# Patient Record
Sex: FEMALE | Race: WHITE | NOT HISPANIC OR LATINO | Employment: FULL TIME | URBAN - METROPOLITAN AREA
[De-identification: names, ages, dates, MRNs, and addresses within clinical notes are randomized per-mention and may not be internally consistent; named-entity substitution may affect disease eponyms.]

---

## 2023-05-03 LAB
C REACTIVE PROTEIN (MG/L) IN SER/PLAS: 0.84 MG/DL
CALCIDIOL (25 OH VITAMIN D3) (NG/ML) IN SER/PLAS: 32 NG/ML
SEDIMENTATION RATE, ERYTHROCYTE: 18 MM/H (ref 0–20)
URATE (MG/DL) IN SER/PLAS: 5.4 MG/DL (ref 2.3–6.7)

## 2023-05-05 LAB — HLAB27 TYPING: NEGATIVE

## 2023-05-08 LAB
ALBUMIN ELP: 4.3 G/DL (ref 3.4–5)
ALPHA 1: 0.3 G/DL (ref 0.2–0.6)
ALPHA 2: 0.9 G/DL (ref 0.4–1.1)
BETA: 1 G/DL (ref 0.5–1.2)
CITRULLINE ANTIBODY, IGG: 3 UNITS (ref 0–19)
GAMMA GLOBULIN: 1.1 G/DL (ref 0.5–1.4)
PATH REVIEW - SERUM IMMUNOFIXATION: NORMAL
PATH REVIEW-SERUM PROTEIN ELECTROPHORESIS: NORMAL
PROTEIN ELECTROPHORESIS INTERPRETATION: NORMAL
PROTEIN TOTAL: 7.6 G/DL (ref 6.4–8.2)
SERUM IMMUNOFIXATION INTERPRETATION: NORMAL

## 2023-09-27 DIAGNOSIS — E78.00 ELEVATED LDL CHOLESTEROL LEVEL: Primary | ICD-10-CM

## 2023-09-27 DIAGNOSIS — E78.5 HYPERLIPIDEMIA, UNSPECIFIED HYPERLIPIDEMIA TYPE: ICD-10-CM

## 2023-10-17 ENCOUNTER — APPOINTMENT (OUTPATIENT)
Dept: RADIOLOGY | Facility: CLINIC | Age: 33
End: 2023-10-17
Payer: COMMERCIAL

## 2023-10-18 ENCOUNTER — LAB (OUTPATIENT)
Dept: LAB | Facility: LAB | Age: 33
End: 2023-10-18
Payer: COMMERCIAL

## 2023-10-18 DIAGNOSIS — E78.00 ELEVATED LDL CHOLESTEROL LEVEL: ICD-10-CM

## 2023-10-18 LAB
ALBUMIN SERPL BCP-MCNC: 4.1 G/DL (ref 3.4–5)
ALP SERPL-CCNC: 107 U/L (ref 33–110)
ALT SERPL W P-5'-P-CCNC: 13 U/L (ref 7–45)
ANION GAP SERPL CALC-SCNC: 11 MMOL/L (ref 10–20)
AST SERPL W P-5'-P-CCNC: 16 U/L (ref 9–39)
BILIRUB SERPL-MCNC: 0.5 MG/DL (ref 0–1.2)
BUN SERPL-MCNC: 8 MG/DL (ref 6–23)
CALCIUM SERPL-MCNC: 9.4 MG/DL (ref 8.6–10.3)
CHLORIDE SERPL-SCNC: 103 MMOL/L (ref 98–107)
CHOLEST SERPL-MCNC: 242 MG/DL (ref 0–199)
CHOLESTEROL/HDL RATIO: 4.7
CO2 SERPL-SCNC: 28 MMOL/L (ref 21–32)
CREAT SERPL-MCNC: 0.76 MG/DL (ref 0.5–1.05)
GFR SERPL CREATININE-BSD FRML MDRD: >90 ML/MIN/1.73M*2
GLUCOSE SERPL-MCNC: 86 MG/DL (ref 74–99)
HDLC SERPL-MCNC: 51.5 MG/DL
LDLC SERPL CALC-MCNC: 152 MG/DL
NON HDL CHOLESTEROL: 191 MG/DL (ref 0–149)
POTASSIUM SERPL-SCNC: 4.1 MMOL/L (ref 3.5–5.3)
PROT SERPL-MCNC: 6.8 G/DL (ref 6.4–8.2)
SODIUM SERPL-SCNC: 138 MMOL/L (ref 136–145)
TRIGL SERPL-MCNC: 191 MG/DL (ref 0–149)
VLDL: 38 MG/DL (ref 0–40)

## 2023-10-18 PROCEDURE — 36415 COLL VENOUS BLD VENIPUNCTURE: CPT

## 2023-10-18 PROCEDURE — 80061 LIPID PANEL: CPT

## 2023-10-18 PROCEDURE — 80053 COMPREHEN METABOLIC PANEL: CPT

## 2023-10-19 NOTE — RESULT ENCOUNTER NOTE
Cholesterol 242, 51, 152, 191, send cholesterol remains elevated, not currently taking medication again it may be something you want to consider due to the fact that you have that marginal risk, especially going back to when we did that small particle LDL which was noted to be 472.  If it is something that you would consider, please schedule an appointment so we can discuss options for you    Sugar, kidney, liver, electrolytes normal

## 2023-10-23 ENCOUNTER — OFFICE VISIT (OUTPATIENT)
Dept: PRIMARY CARE | Facility: CLINIC | Age: 33
End: 2023-10-23
Payer: COMMERCIAL

## 2023-10-23 VITALS
HEIGHT: 68 IN | WEIGHT: 169.6 LBS | DIASTOLIC BLOOD PRESSURE: 70 MMHG | SYSTOLIC BLOOD PRESSURE: 104 MMHG | HEART RATE: 81 BPM | BODY MASS INDEX: 25.7 KG/M2

## 2023-10-23 DIAGNOSIS — L40.50 PSORIATIC ARTHRITIS (MULTI): ICD-10-CM

## 2023-10-23 DIAGNOSIS — E78.00 ELEVATED LDL CHOLESTEROL LEVEL: Primary | ICD-10-CM

## 2023-10-23 PROBLEM — R76.8 ANA POSITIVE: Status: ACTIVE | Noted: 2023-10-23

## 2023-10-23 PROBLEM — N93.8 DYSFUNCTIONAL UTERINE BLEEDING: Status: ACTIVE | Noted: 2023-10-23

## 2023-10-23 PROBLEM — L90.0 LICHEN SCLEROSUS: Status: ACTIVE | Noted: 2023-10-23

## 2023-10-23 PROBLEM — K21.9 GERD WITHOUT ESOPHAGITIS: Status: ACTIVE | Noted: 2023-10-23

## 2023-10-23 PROBLEM — H52.203 ASTIGMATISM, BILATERAL: Status: ACTIVE | Noted: 2023-10-23

## 2023-10-23 PROCEDURE — 1036F TOBACCO NON-USER: CPT | Performed by: FAMILY MEDICINE

## 2023-10-23 PROCEDURE — 99213 OFFICE O/P EST LOW 20 MIN: CPT | Performed by: FAMILY MEDICINE

## 2023-10-23 RX ORDER — PANTOPRAZOLE SODIUM 40 MG/1
TABLET, DELAYED RELEASE ORAL
COMMUNITY
Start: 2023-01-11

## 2023-10-23 RX ORDER — ATORVASTATIN CALCIUM 10 MG/1
10 TABLET, FILM COATED ORAL DAILY
Qty: 90 TABLET | Refills: 1 | Status: SHIPPED | OUTPATIENT
Start: 2023-10-23 | End: 2024-04-10 | Stop reason: SDUPTHER

## 2023-10-23 RX ORDER — DOCUSATE SODIUM 100 MG/1
100 CAPSULE, LIQUID FILLED ORAL 2 TIMES DAILY PRN
COMMUNITY
Start: 2021-08-23

## 2023-10-23 NOTE — PROGRESS NOTES
Subjective   Patient ID: Babita Oconnor is a 33 y.o. female who presents for Hyperlipidemia.    HPI  Lipid panel from 10/18/2023 revealing cholesterol 242, 51, 152, 191  Here today to discuss starting medication especially since a small particle LDL was noted to be 472 in the past  Reports that her and her  plan on getting pregnant within the next year or so and wonders if starting medication is safe    Review of Systems  All pertinent positive symptoms are included in the history of present illness.    All other systems have been reviewed and are negative and noncontributory to this patient's current ailments.    Past Medical History:   Diagnosis Date    12 weeks gestation of pregnancy 02/17/2021    12 weeks gestation of pregnancy    16 weeks gestation of pregnancy 03/19/2021    16 weeks gestation of pregnancy    20 weeks gestation of pregnancy 04/16/2021    20 weeks gestation of pregnancy    22 weeks gestation of pregnancy 05/04/2021    22 weeks gestation of pregnancy    24 weeks gestation of pregnancy 05/17/2021    24 weeks gestation of pregnancy    28 weeks gestation of pregnancy 06/11/2021    28 weeks gestation of pregnancy    32 weeks gestation of pregnancy 07/09/2021    32 weeks gestation of pregnancy    34 weeks gestation of pregnancy 07/21/2021    34 weeks gestation of pregnancy    36 weeks gestation of pregnancy 08/06/2021    36 weeks gestation of pregnancy    37 weeks gestation of pregnancy 08/17/2021    37 weeks gestation of pregnancy    38 weeks gestation of pregnancy 08/20/2021    38 weeks gestation of pregnancy    Encounter for gynecological examination (general) (routine) without abnormal findings 03/18/2022    Well woman exam with routine gynecological exam    Encounter for immunization 07/09/2021    Encounter for immunization    Encounter for insertion of intrauterine contraceptive device 09/26/2022    Encounter for insertion of progestin-releasing intrauterine contraceptive device (IUD)     Encounter for routine postpartum follow-up 2021    Postpartum exam    False labor before 37 completed weeks of gestation, unspecified trimester     Threatened premature labor, antepartum    Missed  2020    Missed     Other specified health status     Known health problems: none    Pelvic and perineal pain 2021    Pelvic pain in female    Personal history of other complications of pregnancy, childbirth and the puerperium 2021    History of hyperemesis gravidarum    Personal history of other diseases of the female genital tract 10/06/2020    History of dysfunctional uterine bleeding    Personal history of other drug therapy 10/28/2022    History of influenza vaccination    Personal history of other specified conditions 2021    History of decreased fetal movements    Personal history of other specified conditions 2020    History of dysuria    Secondary amenorrhea 2021    Secondary amenorrhea    Threatened  2020    , threatened, antepartum     Past Surgical History:   Procedure Laterality Date    OTHER SURGICAL HISTORY  2022    Nasal septoplasty    OTHER SURGICAL HISTORY  2022    [place] tooth extraction     Social History     Tobacco Use    Smoking status: Never    Smokeless tobacco: Never     No family history on file.  Allergies   Allergen Reactions    Nitrofurantoin Monohyd/M-Cryst Itching and Other     Itching palms and feet    Penicillin Other    Sulfamethoxazole-Trimethoprim Rash     Immunization History   Administered Date(s) Administered    DTP 1988, 1988, 1988, 10/06/1989, 1993    Flu vaccine (IIV4), preservative free *Check age/dose* 10/28/2022    HPV, Quadrivalent 2007, 2007, 2007    Hepatitis B vaccine, pediatric/adolescent (RECOMBIVAX, ENGERIX) 2001, 2001, 2002    Hib (HbOC) 1989    Influenza, Unspecified 10/01/2011    Influenza, injectable, MDCK,  "preservative free, quadrivalent 01/09/2022, 10/19/2023    Influenza, injectable, quadrivalent 01/31/2020    MMR vaccine, subcutaneous (MMR II) 07/05/1989, 06/30/2000    Meningococcal MPSV4 01/11/2006    OPV 1988, 1988, 1988, 04/06/1990, 04/02/1993    Pfizer Purple Cap SARS-CoV-2 05/21/2021, 06/11/2021, 01/09/2022    Td vaccine, age 7 years and older (TDVAX) 06/30/2000    Tdap vaccine, age 7 year and older (BOOSTRIX) 03/04/2009, 07/09/2021    Varicella vaccine, subcutaneous (VARIVAX) 04/27/1995     Current Outpatient Medications   Medication Instructions    atorvastatin (LIPITOR) 10 mg, oral, Daily    docusate sodium (COLACE) 100 mg, oral, 2 times daily PRN    pantoprazole (ProtoNix) 40 mg EC tablet oral     Objective   Visit Vitals  /70 (BP Location: Left arm, Patient Position: Sitting, BP Cuff Size: Adult)   Pulse 81   Ht 1.715 m (5' 7.5\")   Wt 76.9 kg (169 lb 9.6 oz)   BMI 26.17 kg/m²   Smoking Status Never   BSA 1.91 m²     Lab on 10/18/2023   Component Date Value    Cholesterol 10/18/2023 242 (H)     HDL-Cholesterol 10/18/2023 51.5     Cholesterol/HDL Ratio 10/18/2023 4.7     LDL Calculated 10/18/2023 152 (H)     VLDL 10/18/2023 38     Triglycerides 10/18/2023 191 (H)     Non HDL Cholesterol 10/18/2023 191 (H)     Glucose 10/18/2023 86     Sodium 10/18/2023 138     Potassium 10/18/2023 4.1     Chloride 10/18/2023 103     Bicarbonate 10/18/2023 28     Anion Gap 10/18/2023 11     Urea Nitrogen 10/18/2023 8     Creatinine 10/18/2023 0.76     eGFR 10/18/2023 >90     Calcium 10/18/2023 9.4     Albumin 10/18/2023 4.1     Alkaline Phosphatase 10/18/2023 107     Total Protein 10/18/2023 6.8     AST 10/18/2023 16     Bilirubin, Total 10/18/2023 0.5     ALT 10/18/2023 13      Physical Exam  CONSTITUTIONAL - well nourished, well developed, looks like stated age, in no acute distress, not ill-appearing, and not tired appearing  SKIN - normal skin color and pigmentation, normal skin turgor without " rash, lesions, or nodules visualized  HEAD - no trauma, normocephalic  CHEST - clear to auscultation, no wheezing, no crackles and no rales, good effort  CARDIAC - regular rate and regular rhythm, no skipped beats, no murmur  EXTREMITIES - no edema, no deformities  NEUROLOGICAL - normal gait, normal balance, normal motor  PSYCHIATRIC - alert, pleasant and cordial, age-appropriate     Assessment/Plan   Problem List Items Addressed This Visit       Elevated LDL cholesterol level - Primary     Through shared decision making, we decided to start you on atorvastatin 10 mg daily  Please follow-up in 6 months  This medication is NOT safe for pregnancy, as mentioned, please notify Dr. Santos prior to trying so that we can safely discontinue this medication         Relevant Medications    atorvastatin (Lipitor) 10 mg tablet    Psoriatic arthritis (CMS/HCC)     Please continue to follow with rheumatology per protocol

## 2023-10-23 NOTE — ASSESSMENT & PLAN NOTE
Through shared decision making, we decided to start you on atorvastatin 10 mg daily  Please follow-up in 6 months  This medication is NOT safe for pregnancy, as mentioned, please notify Dr. Santos prior to trying so that we can safely discontinue this medication

## 2023-12-14 RX ORDER — CLOBETASOL PROPIONATE 0.5 MG/G
CREAM TOPICAL 2 TIMES DAILY
COMMUNITY

## 2023-12-14 RX ORDER — PROGESTERONE 100 MG/1
100 CAPSULE ORAL DAILY
COMMUNITY

## 2023-12-14 RX ORDER — FLUOCINOLONE ACETONIDE 0.1 MG/G
CREAM TOPICAL 2 TIMES DAILY
COMMUNITY

## 2023-12-14 RX ORDER — KETOCONAZOLE 20 MG/G
CREAM TOPICAL DAILY
COMMUNITY

## 2024-01-03 ENCOUNTER — APPOINTMENT (OUTPATIENT)
Dept: RHEUMATOLOGY | Facility: CLINIC | Age: 34
End: 2024-01-03
Payer: COMMERCIAL

## 2024-01-23 ENCOUNTER — APPOINTMENT (OUTPATIENT)
Dept: RHEUMATOLOGY | Facility: CLINIC | Age: 34
End: 2024-01-23
Payer: COMMERCIAL

## 2024-02-01 ENCOUNTER — OFFICE VISIT (OUTPATIENT)
Dept: PRIMARY CARE | Facility: CLINIC | Age: 34
End: 2024-02-01
Payer: COMMERCIAL

## 2024-02-01 VITALS
WEIGHT: 168 LBS | DIASTOLIC BLOOD PRESSURE: 70 MMHG | BODY MASS INDEX: 25.92 KG/M2 | HEART RATE: 68 BPM | SYSTOLIC BLOOD PRESSURE: 120 MMHG

## 2024-02-01 DIAGNOSIS — L40.50 PSORIATIC ARTHRITIS (MULTI): ICD-10-CM

## 2024-02-01 DIAGNOSIS — F41.9 ANXIETY: Primary | ICD-10-CM

## 2024-02-01 PROBLEM — R30.0 DYSURIA: Status: ACTIVE | Noted: 2024-02-01

## 2024-02-01 PROBLEM — O21.0 HYPEREMESIS GRAVIDARUM (HHS-HCC): Status: ACTIVE | Noted: 2024-02-01

## 2024-02-01 PROBLEM — O16.9: Status: ACTIVE | Noted: 2024-02-01

## 2024-02-01 PROBLEM — O36.8190 DECREASED FETAL MOVEMENT DURING PREGNANCY (HHS-HCC): Status: ACTIVE | Noted: 2024-02-01

## 2024-02-01 PROBLEM — M25.50 ARTHRALGIA OF MULTIPLE JOINTS: Status: ACTIVE | Noted: 2024-02-01

## 2024-02-01 PROCEDURE — 3074F SYST BP LT 130 MM HG: CPT | Performed by: FAMILY MEDICINE

## 2024-02-01 PROCEDURE — 99214 OFFICE O/P EST MOD 30 MIN: CPT | Performed by: FAMILY MEDICINE

## 2024-02-01 PROCEDURE — 3078F DIAST BP <80 MM HG: CPT | Performed by: FAMILY MEDICINE

## 2024-02-01 PROCEDURE — 1036F TOBACCO NON-USER: CPT | Performed by: FAMILY MEDICINE

## 2024-02-01 RX ORDER — FLUOXETINE 10 MG/1
10 CAPSULE ORAL DAILY
Qty: 30 CAPSULE | Refills: 0 | Status: SHIPPED | OUTPATIENT
Start: 2024-02-01 | End: 2024-02-19 | Stop reason: ALTCHOICE

## 2024-02-01 ASSESSMENT — PATIENT HEALTH QUESTIONNAIRE - PHQ9
2. FEELING DOWN, DEPRESSED OR HOPELESS: NOT AT ALL
SUM OF ALL RESPONSES TO PHQ9 QUESTIONS 1 AND 2: 0
1. LITTLE INTEREST OR PLEASURE IN DOING THINGS: NOT AT ALL

## 2024-02-01 NOTE — PROGRESS NOTES
Subjective   Patient ID: Babita Oconnor is a 33 y.o. female who presents for Anxiety.    Past Medical, Surgical, and Family History reviewed and updated in chart.    Reviewed all medications by prescribing practitioner or clinical pharmacist (such as prescriptions, OTCs, herbal therapies and supplements) and documented in the medical record.    HPI  Babita has returned to discuss an increase in her anxiety symptoms over the past month, coinciding with the start of her new job in [employer]. While she is enjoying her new role, the steep learning curve has been a source of stress. Her anxiety was further heightened approximately three weeks ago during halfway financial planning discussions with her .    Symptoms she has been experiencing include daily chest tightness, diarrhea, decreased appetite, and difficulty concentrating. Babita also mentions that the current dreary weather may be contributing to her symptoms, although she does not believe she is depressed and denies any suicidal thoughts.    She has a history of postpartum depression following the birth of her daughter a year and a half ago. During that time, she used Zoloft, which initially helped but eventually lost its effectiveness. She also tried Wellbutrin, Xanax, and Lexapro. However, Babita found that Lexapro amplified her anxiety and depression, leading her to discontinue all anxiety and depression medications. She was doing well until recently.    Babita has tried walking and meditation to alleviate her anxiety, but these methods have only provided mild relief. She has also tried Xanax, prescribed by her former primary care physician three years ago. However, she does not like the drowsiness it causes.    She is not interested in trying Pristiq, having observed her 's negative experience with this medication. She is open to trying a daily medication, which she can gradually taper off. Babita denies experiencing  shortness of breath, panic attacks, palpitations, or any other concerns. She has noticed occasional tachycardia when checking her heart rate on her Apple watch, but she has not recorded any abnormal EKG readings.    Despite her recent increase in anxiety, Babita feels supported at home and is merely seeking assistance in managing her anxiety. She continues to see her therapist regularly.    Review of Systems  All pertinent positive symptoms are included in the history of present illness.    All other systems have been reviewed and are negative and noncontributory to this patient's current ailments.    Past Medical History:   Diagnosis Date    12 weeks gestation of pregnancy 02/17/2021    12 weeks gestation of pregnancy    16 weeks gestation of pregnancy 03/19/2021    16 weeks gestation of pregnancy    20 weeks gestation of pregnancy 04/16/2021    20 weeks gestation of pregnancy    22 weeks gestation of pregnancy 05/04/2021    22 weeks gestation of pregnancy    24 weeks gestation of pregnancy 05/17/2021    24 weeks gestation of pregnancy    28 weeks gestation of pregnancy 06/11/2021    28 weeks gestation of pregnancy    32 weeks gestation of pregnancy 07/09/2021    32 weeks gestation of pregnancy    34 weeks gestation of pregnancy 07/21/2021    34 weeks gestation of pregnancy    36 weeks gestation of pregnancy 08/06/2021    36 weeks gestation of pregnancy    37 weeks gestation of pregnancy 08/17/2021    37 weeks gestation of pregnancy    38 weeks gestation of pregnancy 08/20/2021    38 weeks gestation of pregnancy    Encounter for gynecological examination (general) (routine) without abnormal findings 03/18/2022    Well woman exam with routine gynecological exam    Encounter for immunization 07/09/2021    Encounter for immunization    Encounter for insertion of intrauterine contraceptive device 09/26/2022    Encounter for insertion of progestin-releasing intrauterine contraceptive device (IUD)    Encounter for  routine postpartum follow-up 2021    Postpartum exam    False labor before 37 completed weeks of gestation, unspecified trimester     Threatened premature labor, antepartum    Missed  2020    Missed     Other specified health status     Known health problems: none    Pelvic and perineal pain 2021    Pelvic pain in female    Personal history of other complications of pregnancy, childbirth and the puerperium 2021    History of hyperemesis gravidarum    Personal history of other diseases of the female genital tract 10/06/2020    History of dysfunctional uterine bleeding    Personal history of other drug therapy 10/28/2022    History of influenza vaccination    Personal history of other specified conditions 2021    History of decreased fetal movements    Personal history of other specified conditions 2020    History of dysuria    Secondary amenorrhea 2021    Secondary amenorrhea    Threatened  2020    , threatened, antepartum     Past Surgical History:   Procedure Laterality Date    OTHER SURGICAL HISTORY  2022    Nasal septoplasty    OTHER SURGICAL HISTORY  2022    [place] tooth extraction     Social History     Tobacco Use    Smoking status: Never    Smokeless tobacco: Never     Family History   Problem Relation Name Age of Onset    Atrial fibrillation Mother      Atrial fibrillation Father      Stroke Maternal Grandmother      Macular degeneration Maternal Grandmother       Immunization History   Administered Date(s) Administered    DTP 1988, 1988, 1988, 10/06/1989, 1993    Flu vaccine (IIV4), preservative free *Check age/dose* 10/28/2022    Flu vaccine, quadrivalent, no egg protein, age 6 month or greater (FLUCELVAX) 2022, 10/19/2023    HPV, Quadrivalent 2007, 2007, 2007    Hepatitis B vaccine, pediatric/adolescent (RECOMBIVAX, ENGERIX) 2001, 2001, 2002    Hib  (HbOC) 11/03/1989    Influenza, Unspecified 10/01/2011    Influenza, injectable, quadrivalent 01/31/2020    MMR vaccine, subcutaneous (MMR II) 07/05/1989, 06/30/2000    Meningococcal MPSV4 01/11/2006    OPV 1988, 1988, 1988, 04/06/1990, 04/02/1993    Pfizer Purple Cap SARS-CoV-2 05/21/2021, 06/11/2021, 01/09/2022    Td vaccine, age 7 years and older (TDVAX) 06/30/2000    Tdap vaccine, age 7 year and older (BOOSTRIX, ADACEL) 03/04/2009, 07/09/2021    Varicella vaccine, subcutaneous (VARIVAX) 04/27/1995     Current Outpatient Medications   Medication Instructions    atorvastatin (LIPITOR) 10 mg, oral, Daily    clobetasol (Temovate) 0.05 % cream Topical, 2 times daily    docusate sodium (COLACE) 100 mg, oral, 2 times daily PRN    fluocinolone 0.01 % cream Topical, 2 times daily    FLUoxetine (PROZAC) 10 mg, oral, Daily    ketoconazole (NIZOral) 2 % cream Topical, Daily    levonorgestreL (LILETTA) 20.4 mcg/24 hrs (8 yrs) 52 mg intrauterine device intrauterine, Once    pantoprazole (ProtoNix) 40 mg EC tablet oral    progesterone (PROMETRIUM) 100 mg, oral, Daily     Allergies   Allergen Reactions    Nitrofurantoin Monohyd/M-Cryst Itching and Other     Itching palms and feet    Penicillin Other    Sulfamethoxazole-Trimethoprim Rash       Objective   Vitals:    02/01/24 0750   BP: 120/70   Pulse: 68   Weight: 76.2 kg (168 lb)     Body mass index is 25.92 kg/m².    BP Readings from Last 3 Encounters:   02/01/24 120/70   10/23/23 104/70   06/20/23 118/77      Wt Readings from Last 3 Encounters:   02/01/24 76.2 kg (168 lb)   10/23/23 76.9 kg (169 lb 9.6 oz)   06/20/23 77.6 kg (171 lb)        No visits with results within 1 Month(s) from this visit.   Latest known visit with results is:   Lab on 10/18/2023   Component Date Value    Cholesterol 10/18/2023 242 (H)     HDL-Cholesterol 10/18/2023 51.5     Cholesterol/HDL Ratio 10/18/2023 4.7     LDL Calculated 10/18/2023 152 (H)     VLDL 10/18/2023 38      Triglycerides 10/18/2023 191 (H)     Non HDL Cholesterol 10/18/2023 191 (H)     Glucose 10/18/2023 86     Sodium 10/18/2023 138     Potassium 10/18/2023 4.1     Chloride 10/18/2023 103     Bicarbonate 10/18/2023 28     Anion Gap 10/18/2023 11     Urea Nitrogen 10/18/2023 8     Creatinine 10/18/2023 0.76     eGFR 10/18/2023 >90     Calcium 10/18/2023 9.4     Albumin 10/18/2023 4.1     Alkaline Phosphatase 10/18/2023 107     Total Protein 10/18/2023 6.8     AST 10/18/2023 16     Bilirubin, Total 10/18/2023 0.5     ALT 10/18/2023 13      Physical Exam  CONSTITUTIONAL - well nourished, well developed, looks like stated age, in no acute distress, not ill-appearing, and not tired appearing  SKIN - normal skin color and pigmentation, normal skin turgor without rash, lesions, or nodules visualized  HEAD - no trauma, normocephalic  EYES - pupils are equal and reactive to light, extraocular muscles are intact, and normal external exam  NECK - supple without rigidity, no neck mass was observed  CHEST - clear to auscultation, no wheezing, no crackles and no rales, good effort  CARDIAC - tachycardic rate and regular rhythm, no skipped beats, no murmur  ABDOMEN - no organomegaly, soft, nontender, nondistended, normal bowel sounds, no guarding/rebound/rigidity  EXTREMITIES - no obvious or evident edema, no obvious or evident deformities  NEUROLOGICAL - normal gait, normal balance, normal motor, no ataxia; alert, oriented and no focal signs  PSYCHIATRIC - alert, pleasant and cordial, age-appropriate    Assessment/Plan   Problem List Items Addressed This Visit       Psoriatic arthritis (CMS/HCC)     Stable on current medication regimen         Anxiety - Primary     Based on our discussion, it appears that your anxiety might be accompanied by some underlying depressive symptoms. We explored a range of medication options, including Pristiq, which you expressed concerns about due to your 's significant withdrawal symptoms. We  also considered BuSpar, but you preferred not to take medication twice daily. Ultimately, we decided on fluoxetine. Please try this medication for the next 3 to 4 weeks and inform me about its effectiveness and tolerability. After this trial period, I can adjust the medication as necessary.         Relevant Medications    FLUoxetine (PROzac) 10 mg capsule

## 2024-02-01 NOTE — ASSESSMENT & PLAN NOTE
Based on our discussion, it appears that your anxiety might be accompanied by some underlying depressive symptoms. We explored a range of medication options, including Pristiq, which you expressed concerns about due to your 's significant withdrawal symptoms. We also considered BuSpar, but you preferred not to take medication twice daily. Ultimately, we decided on fluoxetine. Please try this medication for the next 3 to 4 weeks and inform me about its effectiveness and tolerability. After this trial period, I can adjust the medication as necessary.

## 2024-02-19 DIAGNOSIS — F41.9 ANXIETY: Primary | ICD-10-CM

## 2024-02-19 RX ORDER — FLUOXETINE 20 MG/1
20 TABLET ORAL DAILY
Qty: 90 TABLET | Refills: 1 | Status: SHIPPED | OUTPATIENT
Start: 2024-02-19 | End: 2024-04-21

## 2024-02-27 ENCOUNTER — LAB (OUTPATIENT)
Dept: LAB | Facility: LAB | Age: 34
End: 2024-02-27
Payer: COMMERCIAL

## 2024-02-27 ENCOUNTER — OFFICE VISIT (OUTPATIENT)
Dept: RHEUMATOLOGY | Facility: CLINIC | Age: 34
End: 2024-02-27
Payer: COMMERCIAL

## 2024-02-27 VITALS
DIASTOLIC BLOOD PRESSURE: 79 MMHG | HEART RATE: 84 BPM | HEIGHT: 68 IN | WEIGHT: 167.3 LBS | SYSTOLIC BLOOD PRESSURE: 117 MMHG | BODY MASS INDEX: 25.36 KG/M2

## 2024-02-27 DIAGNOSIS — L40.50 PSORIATIC ARTHRITIS (MULTI): ICD-10-CM

## 2024-02-27 DIAGNOSIS — L40.50 PSORIATIC ARTHRITIS (MULTI): Primary | ICD-10-CM

## 2024-02-27 LAB
25(OH)D3 SERPL-MCNC: 22 NG/ML (ref 30–100)
CRP SERPL-MCNC: 0.36 MG/DL
ERYTHROCYTE [DISTWIDTH] IN BLOOD BY AUTOMATED COUNT: 12.8 % (ref 11.5–14.5)
ERYTHROCYTE [SEDIMENTATION RATE] IN BLOOD BY WESTERGREN METHOD: 17 MM/H (ref 0–20)
HCT VFR BLD AUTO: 41 % (ref 36–46)
HGB BLD-MCNC: 13.4 G/DL (ref 12–16)
MCH RBC QN AUTO: 30 PG (ref 26–34)
MCHC RBC AUTO-ENTMCNC: 32.7 G/DL (ref 32–36)
MCV RBC AUTO: 92 FL (ref 80–100)
NRBC BLD-RTO: 0 /100 WBCS (ref 0–0)
PLATELET # BLD AUTO: 330 X10*3/UL (ref 150–450)
RBC # BLD AUTO: 4.47 X10*6/UL (ref 4–5.2)
URATE SERPL-MCNC: 4.4 MG/DL (ref 2.3–6.7)
WBC # BLD AUTO: 4.9 X10*3/UL (ref 4.4–11.3)

## 2024-02-27 PROCEDURE — 36415 COLL VENOUS BLD VENIPUNCTURE: CPT

## 2024-02-27 PROCEDURE — 3074F SYST BP LT 130 MM HG: CPT | Performed by: INTERNAL MEDICINE

## 2024-02-27 PROCEDURE — 82306 VITAMIN D 25 HYDROXY: CPT

## 2024-02-27 PROCEDURE — 86140 C-REACTIVE PROTEIN: CPT

## 2024-02-27 PROCEDURE — 3078F DIAST BP <80 MM HG: CPT | Performed by: INTERNAL MEDICINE

## 2024-02-27 PROCEDURE — 84550 ASSAY OF BLOOD/URIC ACID: CPT

## 2024-02-27 PROCEDURE — 99214 OFFICE O/P EST MOD 30 MIN: CPT | Performed by: INTERNAL MEDICINE

## 2024-02-27 PROCEDURE — 85652 RBC SED RATE AUTOMATED: CPT

## 2024-02-27 PROCEDURE — 1036F TOBACCO NON-USER: CPT | Performed by: INTERNAL MEDICINE

## 2024-02-27 PROCEDURE — 85027 COMPLETE CBC AUTOMATED: CPT

## 2024-02-27 NOTE — PROGRESS NOTES
"Follow-up Rheumatology Patient Visit    Chief Complaint:  Babita Oconnor \"David" is a 33 y.o. female presenting today for Follow-up.    History of Presenting Problem:   34 y/o female with Hx of psoriasis and Diagnosis of PSA after pregnancy at Age 31 present for evaluation. Patient reports her symptoms are mild is to be worse in her ankles, nowadays she has minimal joint complaints, s   She does have a history of severe GERD. She has tried anti-inflammatories in the past which does help pain.  he sees dermatology for psoriatic on her scalp.  At this time she prefers to stay off medications as she reports her joint pain in the ankle is minimal and she is taking topical treatment for psoriasis of the scalp at this time. Dermatologist did recommend another biologic but she does not want to proceed with this. She is managing her diet which seems to be working for her as well  Previous meds:    Tremfya-No benefit  Plaquenil-could not tolerate  She has been on Otezla - improvement but had HAs  Humira- no improvement       Problem List:   Patient Active Problem List   Diagnosis    Elevated LDL cholesterol level    BERNARD positive    Astigmatism, bilateral    Dysfunctional uterine bleeding    GERD without esophagitis    Lichen sclerosus    Psoriasis    Psoriatic arthritis (CMS/HCC)    Arthralgia of multiple joints    Decreased fetal movement during pregnancy    Dysuria    Hyperemesis gravidarum    Hypertension in obstetric context    Mild postpartum depression    Vaginal delivery    Anxiety       Past Medical History:   Past Medical History:   Diagnosis Date    12 weeks gestation of pregnancy 02/17/2021    12 weeks gestation of pregnancy    16 weeks gestation of pregnancy 03/19/2021    16 weeks gestation of pregnancy    20 weeks gestation of pregnancy 04/16/2021    20 weeks gestation of pregnancy    22 weeks gestation of pregnancy 05/04/2021    22 weeks gestation of pregnancy    24 weeks gestation of pregnancy 05/17/2021 "    24 weeks gestation of pregnancy    28 weeks gestation of pregnancy 2021    28 weeks gestation of pregnancy    32 weeks gestation of pregnancy 2021    32 weeks gestation of pregnancy    34 weeks gestation of pregnancy 2021    34 weeks gestation of pregnancy    36 weeks gestation of pregnancy 2021    36 weeks gestation of pregnancy    37 weeks gestation of pregnancy 2021    37 weeks gestation of pregnancy    38 weeks gestation of pregnancy 2021    38 weeks gestation of pregnancy    Encounter for gynecological examination (general) (routine) without abnormal findings 2022    Well woman exam with routine gynecological exam    Encounter for immunization 2021    Encounter for immunization    Encounter for insertion of intrauterine contraceptive device 2022    Encounter for insertion of progestin-releasing intrauterine contraceptive device (IUD)    Encounter for routine postpartum follow-up 2021    Postpartum exam    False labor before 37 completed weeks of gestation, unspecified trimester     Threatened premature labor, antepartum    Missed  2020    Missed     Other specified health status     Known health problems: none    Pelvic and perineal pain 2021    Pelvic pain in female    Personal history of other complications of pregnancy, childbirth and the puerperium 2021    History of hyperemesis gravidarum    Personal history of other diseases of the female genital tract 10/06/2020    History of dysfunctional uterine bleeding    Personal history of other drug therapy 10/28/2022    History of influenza vaccination    Personal history of other specified conditions 2021    History of decreased fetal movements    Personal history of other specified conditions 2020    History of dysuria    Secondary amenorrhea 2021    Secondary amenorrhea    Threatened  2020    , threatened, antepartum  "      Surgical History:   Past Surgical History:   Procedure Laterality Date    OTHER SURGICAL HISTORY  03/31/2022    Nasal septoplasty    OTHER SURGICAL HISTORY  03/31/2022    [place] tooth extraction        Allergies:   Allergies   Allergen Reactions    Nitrofurantoin Monohyd/M-Cryst Itching and Other     Itching palms and feet    Penicillin Other    Sulfamethoxazole-Trimethoprim Rash       Medications:   Current Outpatient Medications:     atorvastatin (Lipitor) 10 mg tablet, Take 1 tablet (10 mg) by mouth once daily., Disp: 90 tablet, Rfl: 1    clobetasol (Temovate) 0.05 % cream, Apply topically 2 times a day., Disp: , Rfl:     docusate sodium (Colace) 100 mg capsule, Take 1 capsule (100 mg) by mouth 2 times a day as needed for constipation., Disp: , Rfl:     fluocinolone 0.01 % cream, Apply topically 2 times a day., Disp: , Rfl:     FLUoxetine (PROzac) 20 mg tablet, Take 1 tablet (20 mg) by mouth once daily., Disp: 90 tablet, Rfl: 1    ketoconazole (NIZOral) 2 % cream, Apply topically once daily., Disp: , Rfl:     levonorgestreL (LILETTA) 20.4 mcg/24 hrs (8 yrs) 52 mg intrauterine device, by intrauterine route 1 time., Disp: , Rfl:     pantoprazole (ProtoNix) 40 mg EC tablet, Take by mouth., Disp: , Rfl:     progesterone (Prometrium) 100 mg capsule, Take 1 capsule (100 mg) by mouth once daily., Disp: , Rfl:       Objective   Physical Examination:    Visit Vitals  /79   Pulse 84   Ht 1.715 m (5' 7.52\")   Wt 75.9 kg (167 lb 4.8 oz)   BMI 25.80 kg/m²   Smoking Status Never   BSA 1.9 m²        Gen: NAD, A&O x 3  HEENT: clear sclera and conjunctiva,     Musculoskeletal:   Neck; WNL, full ROM  Shoulder: WNL, full ROM  Elbow:WNL, full ROM, no effusion noted  Wrist and fingers;no active synovitis noted, Full ROM in the Wrist , Good fist and   Knees:  No effusions or crepitation, full ROM.  Hips; WNL, full ROM, Negative Dominic test  Ankle, Feet; WNL, full ROM    Skin: No rashes or lesions seen, no nail " changes  Neuro: A&O x3, Normal Gait    Procedures :None    Orders:  Orders Placed This Encounter   Procedures    CBC    Vitamin D 25-Hydroxy,Total (for eval of Vitamin D levels)    Sedimentation Rate    C-Reactive Protein    Uric Acid        Provider Impression:   Assessment/Plan   Encounter Diagnosis   Name Primary?    Psoriatic arthritis (CMS/HCC) Yes          32 y/o female with Hx of psoriasis and Diagnosis of PSA after pregnancy at Age 31 present for evaluation.  Patient has been followed without medication regimen for the last 6 months.  She reports no acute joint issues at this time patient is reporting her joint pain is minimal and not limiting her daily activities that she would like to proceed without a medication regimen at this time  -Okay to hold  off on immunosuppressant/DMARDS at this time as patient does not feel any beneficial results from taking the medication.  -check additional serologies  -Follow as needed patient has been following without

## 2024-02-28 DIAGNOSIS — E55.9 VITAMIN D DEFICIENCY: ICD-10-CM

## 2024-02-28 RX ORDER — ERGOCALCIFEROL 1.25 MG/1
50000 CAPSULE ORAL
Qty: 12 CAPSULE | Refills: 0 | Status: SHIPPED | OUTPATIENT
Start: 2024-02-28

## 2024-04-02 DIAGNOSIS — Z00.00 ANNUAL PHYSICAL EXAM: Primary | ICD-10-CM

## 2024-04-02 DIAGNOSIS — E78.00 ELEVATED LDL CHOLESTEROL LEVEL: ICD-10-CM

## 2024-04-10 ENCOUNTER — LAB (OUTPATIENT)
Dept: LAB | Facility: LAB | Age: 34
End: 2024-04-10
Payer: COMMERCIAL

## 2024-04-10 DIAGNOSIS — Z00.00 ANNUAL PHYSICAL EXAM: ICD-10-CM

## 2024-04-10 DIAGNOSIS — E78.00 ELEVATED LDL CHOLESTEROL LEVEL: ICD-10-CM

## 2024-04-10 LAB
ALBUMIN SERPL BCP-MCNC: 4.2 G/DL (ref 3.4–5)
ALP SERPL-CCNC: 92 U/L (ref 33–110)
ALT SERPL W P-5'-P-CCNC: 15 U/L (ref 7–45)
ANION GAP SERPL CALC-SCNC: 11 MMOL/L (ref 10–20)
AST SERPL W P-5'-P-CCNC: 16 U/L (ref 9–39)
BASOPHILS # BLD AUTO: 0.07 X10*3/UL (ref 0–0.1)
BASOPHILS NFR BLD AUTO: 1.2 %
BILIRUB SERPL-MCNC: 0.5 MG/DL (ref 0–1.2)
BUN SERPL-MCNC: 6 MG/DL (ref 6–23)
CALCIUM SERPL-MCNC: 9.2 MG/DL (ref 8.6–10.3)
CHLORIDE SERPL-SCNC: 104 MMOL/L (ref 98–107)
CHOLEST SERPL-MCNC: 180 MG/DL (ref 0–199)
CHOLESTEROL/HDL RATIO: 2.7
CO2 SERPL-SCNC: 27 MMOL/L (ref 21–32)
CREAT SERPL-MCNC: 0.72 MG/DL (ref 0.5–1.05)
EGFRCR SERPLBLD CKD-EPI 2021: >90 ML/MIN/1.73M*2
EOSINOPHIL # BLD AUTO: 0.12 X10*3/UL (ref 0–0.7)
EOSINOPHIL NFR BLD AUTO: 2.1 %
ERYTHROCYTE [DISTWIDTH] IN BLOOD BY AUTOMATED COUNT: 12.5 % (ref 11.5–14.5)
GLUCOSE SERPL-MCNC: 85 MG/DL (ref 74–99)
HCT VFR BLD AUTO: 41.7 % (ref 36–46)
HDLC SERPL-MCNC: 66.4 MG/DL
HGB BLD-MCNC: 13.7 G/DL (ref 12–16)
IMM GRANULOCYTES # BLD AUTO: 0.01 X10*3/UL (ref 0–0.7)
IMM GRANULOCYTES NFR BLD AUTO: 0.2 % (ref 0–0.9)
LDLC SERPL CALC-MCNC: 86 MG/DL
LYMPHOCYTES # BLD AUTO: 1.78 X10*3/UL (ref 1.2–4.8)
LYMPHOCYTES NFR BLD AUTO: 30.9 %
MCH RBC QN AUTO: 29.8 PG (ref 26–34)
MCHC RBC AUTO-ENTMCNC: 32.9 G/DL (ref 32–36)
MCV RBC AUTO: 91 FL (ref 80–100)
MONOCYTES # BLD AUTO: 0.45 X10*3/UL (ref 0.1–1)
MONOCYTES NFR BLD AUTO: 7.8 %
NEUTROPHILS # BLD AUTO: 3.33 X10*3/UL (ref 1.2–7.7)
NEUTROPHILS NFR BLD AUTO: 57.8 %
NON HDL CHOLESTEROL: 114 MG/DL (ref 0–149)
NRBC BLD-RTO: 0 /100 WBCS (ref 0–0)
PLATELET # BLD AUTO: 309 X10*3/UL (ref 150–450)
POTASSIUM SERPL-SCNC: 4.2 MMOL/L (ref 3.5–5.3)
PROT SERPL-MCNC: 6.8 G/DL (ref 6.4–8.2)
RBC # BLD AUTO: 4.6 X10*6/UL (ref 4–5.2)
SODIUM SERPL-SCNC: 138 MMOL/L (ref 136–145)
TRIGL SERPL-MCNC: 137 MG/DL (ref 0–149)
TSH SERPL-ACNC: 1.39 MIU/L (ref 0.44–3.98)
VLDL: 27 MG/DL (ref 0–40)
WBC # BLD AUTO: 5.8 X10*3/UL (ref 4.4–11.3)

## 2024-04-10 PROCEDURE — 85025 COMPLETE CBC W/AUTO DIFF WBC: CPT

## 2024-04-10 PROCEDURE — 36415 COLL VENOUS BLD VENIPUNCTURE: CPT

## 2024-04-10 PROCEDURE — 84443 ASSAY THYROID STIM HORMONE: CPT

## 2024-04-10 PROCEDURE — 80053 COMPREHEN METABOLIC PANEL: CPT

## 2024-04-10 PROCEDURE — 80061 LIPID PANEL: CPT

## 2024-04-10 RX ORDER — ATORVASTATIN CALCIUM 10 MG/1
10 TABLET, FILM COATED ORAL DAILY
Qty: 90 TABLET | Refills: 1 | Status: SHIPPED | OUTPATIENT
Start: 2024-04-10 | End: 2024-10-07

## 2024-04-10 NOTE — RESULT ENCOUNTER NOTE
Thyroid looks perfect with a TSH of 1.39  Cholesterol 180, 66, 86, 137.  1 incredible turnaround from your previous blood levels  Sugar, kidney, liver, electrolytes, complete blood cell count are excellent across-the-board    No changes to medication recommended, so I will send over Lipitor 10 mg daily for the next 6 months

## 2024-04-18 DIAGNOSIS — F41.9 ANXIETY: ICD-10-CM

## 2024-04-21 RX ORDER — FLUOXETINE 20 MG/1
20 TABLET ORAL DAILY
Qty: 90 TABLET | Refills: 0 | Status: SHIPPED | OUTPATIENT
Start: 2024-04-21 | End: 2024-07-20

## 2024-07-08 DIAGNOSIS — F41.9 ANXIETY: ICD-10-CM

## 2024-07-08 RX ORDER — FLUOXETINE 20 MG/1
20 TABLET ORAL DAILY
Qty: 90 TABLET | Refills: 0 | Status: SHIPPED | OUTPATIENT
Start: 2024-07-08 | End: 2024-10-06

## 2024-08-07 ENCOUNTER — APPOINTMENT (OUTPATIENT)
Dept: PRIMARY CARE | Facility: CLINIC | Age: 34
End: 2024-08-07
Payer: COMMERCIAL

## 2024-08-09 ENCOUNTER — OFFICE VISIT (OUTPATIENT)
Dept: PRIMARY CARE | Facility: CLINIC | Age: 34
End: 2024-08-09
Payer: COMMERCIAL

## 2024-08-09 VITALS
WEIGHT: 177 LBS | OXYGEN SATURATION: 98 % | SYSTOLIC BLOOD PRESSURE: 100 MMHG | HEART RATE: 76 BPM | DIASTOLIC BLOOD PRESSURE: 64 MMHG | BODY MASS INDEX: 27.3 KG/M2

## 2024-08-09 DIAGNOSIS — M79.632 PAIN IN BOTH FOREARMS: ICD-10-CM

## 2024-08-09 DIAGNOSIS — M79.631 PAIN IN BOTH FOREARMS: ICD-10-CM

## 2024-08-09 DIAGNOSIS — G56.23 ULNAR NEUROPATHY OF BOTH UPPER EXTREMITIES: Primary | ICD-10-CM

## 2024-08-09 PROCEDURE — 99214 OFFICE O/P EST MOD 30 MIN: CPT | Performed by: FAMILY MEDICINE

## 2024-08-09 PROCEDURE — 3078F DIAST BP <80 MM HG: CPT | Performed by: FAMILY MEDICINE

## 2024-08-09 PROCEDURE — 1036F TOBACCO NON-USER: CPT | Performed by: FAMILY MEDICINE

## 2024-08-09 PROCEDURE — 3074F SYST BP LT 130 MM HG: CPT | Performed by: FAMILY MEDICINE

## 2024-08-09 RX ORDER — METHYLPREDNISOLONE 4 MG/1
TABLET ORAL
Qty: 21 TABLET | Refills: 0 | Status: SHIPPED | OUTPATIENT
Start: 2024-08-09

## 2024-08-09 RX ORDER — FLUOCINONIDE TOPICAL SOLUTION USP, 0.05% 0.5 MG/ML
SOLUTION TOPICAL
COMMUNITY
Start: 2024-07-08

## 2024-08-09 ASSESSMENT — PATIENT HEALTH QUESTIONNAIRE - PHQ9
SUM OF ALL RESPONSES TO PHQ9 QUESTIONS 1 AND 2: 0
2. FEELING DOWN, DEPRESSED OR HOPELESS: NOT AT ALL
1. LITTLE INTEREST OR PLEASURE IN DOING THINGS: NOT AT ALL

## 2024-08-09 NOTE — PROGRESS NOTES
"Subjective   Patient ID: Babita Oconnor \"Emily\" is a 34 y.o. female who presents for Bilateral Finger Numbness.    Past Medical, Surgical, and Family History reviewed and updated in chart.    Reviewed all medications by prescribing practitioner or clinical pharmacist (such as prescriptions, OTCs, herbal therapies and supplements) and documented in the medical record.    KINGSLEY Diaz reports a few weeks' history of numbness and tingling in both forearms, starting at the elbows and radiating down to the wrists. 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    Emily notes that the symptoms worsen when she is driving or performing activities that require her to rest her forearms. She has not tried any over-the-counter relief methods. Emily denies any other neurologic symptoms such as blurry vision, worsening headaches, or numbness/tingling in the lower extremities.    It is noteworthy that Emily has a history of psoriatic arthritis, for which she follows up with a rheumatologist. However, she does not believe her current symptoms are consistent with an arthritis flare-up, as her arthritis predominantly affects her bilateral ankles.    Review of Systems  All pertinent positive symptoms are included in the history of present illness.    All other systems have been reviewed and are negative and noncontributory to this patient's current ailments.    Past Medical History:   Diagnosis Date    12 weeks gestation of pregnancy ([facility]) 02/17/2021    12 weeks gestation of pregnancy    16 weeks gestation of pregnancy ([facility]) 03/19/2021    16 weeks gestation of pregnancy    20 weeks gestation of pregnancy ([facility]) 04/16/2021    20 weeks gestation of pregnancy    22 weeks gestation of pregnancy ([facility]) 05/04/2021    22 weeks gestation of pregnancy    24 weeks gestation of pregnancy ([facility]) 05/17/2021    24 weeks gestation of " pregnancy    28 weeks gestation of pregnancy ([facility]) 2021    28 weeks gestation of pregnancy    32 weeks gestation of pregnancy ([facility]) 2021    32 weeks gestation of pregnancy    34 weeks gestation of pregnancy ([facility]) 2021    34 weeks gestation of pregnancy    36 weeks gestation of pregnancy ([facility]) 2021    36 weeks gestation of pregnancy    37 weeks gestation of pregnancy ([facility]) 2021    37 weeks gestation of pregnancy    38 weeks gestation of pregnancy ([facility]) 2021    38 weeks gestation of pregnancy    Encounter for gynecological examination (general) (routine) without abnormal findings 2022    Well woman exam with routine gynecological exam    Encounter for immunization 2021    Encounter for immunization    Encounter for insertion of intrauterine contraceptive device 2022    Encounter for insertion of progestin-releasing intrauterine contraceptive device (IUD)    Encounter for routine postpartum follow-up ([facility]) 2021    Postpartum exam    False labor before 37 completed weeks of gestation, unspecified trimester ([facility])     Threatened premature labor, antepartum    Missed  ([facility]) 2020    Missed     Other specified health status     Known health problems: none    Pelvic and perineal pain 2021    Pelvic pain in female    Personal history of other complications of pregnancy, childbirth and the puerperium 2021    History of hyperemesis gravidarum    Personal history of other diseases of the female genital tract 10/06/2020    History of dysfunctional uterine bleeding    Personal history of other drug therapy 10/28/2022    History of influenza vaccination    Personal history of other specified conditions 2021    History of decreased fetal movements    Personal history of other specified conditions 2020    History of dysuria    Secondary amenorrhea 2021    Secondary amenorrhea    Threatened   ([facility]) 2020    , threatened, antepartum     Past Surgical History:   Procedure Laterality Date    OTHER SURGICAL HISTORY  2022    Nasal septoplasty    OTHER SURGICAL HISTORY  2022    [place] tooth extraction     Social History     Tobacco Use    Smoking status: Never    Smokeless tobacco: Never     Family History   Problem Relation Name Age of Onset    Atrial fibrillation Mother      Atrial fibrillation Father      Stroke Maternal Grandmother      Macular degeneration Maternal Grandmother       Immunization History   Administered Date(s) Administered    DTP 1988, 1988, 1988, 10/06/1989, 1993    Flu vaccine (IIV4), preservative free *Check age/dose* 10/28/2022    Flu vaccine, quadrivalent, no egg protein, age 6 month or greater (FLUCELVAX) 2022, 10/19/2023    HPV, Quadrivalent 2007, 2007, 2007    Hepatitis B vaccine, 19 yrs and under (RECOMBIVAX, ENGERIX) 2001, 2001, 2002    Hib (HbOC) 1989    Influenza, Unspecified 10/01/2011    Influenza, injectable, quadrivalent 2020    MMR vaccine, subcutaneous (MMR II) 1989, 2000    Meningococcal MPSV4 2006    OPV 1988, 1988, 1988, 1990, 1993    Pfizer Purple Cap SARS-CoV-2 2021, 2021, 2022    Td vaccine, age 7 years and older (TDVAX) 2000    Tdap vaccine, age 7 year and older (BOOSTRIX, ADACEL) 2009, 2021    Varicella vaccine, subcutaneous (VARIVAX) 1995     Current Outpatient Medications   Medication Instructions    atorvastatin (LIPITOR) 10 mg, oral, Daily    clobetasol (Temovate) 0.05 % cream Topical, 2 times daily    docusate sodium (COLACE) 100 mg, oral, 2 times daily PRN    ergocalciferol (VITAMIN D-2) 50,000 Units, oral, Once Weekly    fluocinolone 0.01 % cream Topical, 2 times daily    fluocinonide (Lidex) 0.05 % external solution     FLUoxetine (PROZAC) 20 mg, oral,  Daily    ketoconazole (NIZOral) 2 % cream Topical, Daily    levonorgestreL (LILETTA) 20.4 mcg/24 hrs (8 yrs) 52 mg intrauterine device intrauterine, Once    methylPREDNISolone (Medrol, Saurabh,) 4 mg tablets Follow schedule on package instructions    pantoprazole (ProtoNix) 40 mg EC tablet oral    progesterone (PROMETRIUM) 100 mg, oral, Daily     Allergies   Allergen Reactions    Nitrofurantoin Monohyd/M-Cryst Itching and Other     Itching palms and feet    Penicillin Other    Sulfamethoxazole-Trimethoprim Rash       Objective   Vitals:    08/09/24 1409   BP: 100/64   BP Location: Left arm   Patient Position: Sitting   BP Cuff Size: Adult   Pulse: 76   SpO2: 98%   Weight: 80.3 kg (177 lb)     Body mass index is 27.3 kg/m².    BP Readings from Last 3 Encounters:   08/09/24 100/64   02/27/24 117/79   02/01/24 120/70      Wt Readings from Last 3 Encounters:   08/09/24 80.3 kg (177 lb)   02/27/24 75.9 kg (167 lb 4.8 oz)   02/01/24 76.2 kg (168 lb)      Physical Exam  CONSTITUTIONAL - well nourished, well developed, looks like stated age, in no acute distress, not ill-appearing, and not tired appearing  SKIN - normal skin color and pigmentation, normal skin turgor without rash, lesions, or nodules visualized  HEAD - no trauma, normocephalic  EYES - extraocular muscles are intact, and normal external exam  CHEST - no obvious respiratory distress  CARDIAC - regular rate and regular rhythm, no skipped beats, no murmurs  EXTREMITIES - no obvious or evident edema, no obvious or evident deformities  NEUROLOGICAL -  alert, oriented and no focal signs + numbness and tingling in the bilateral forearms   PSYCHIATRIC - alert, pleasant and cordial, age-appropriate    Assessment/Plan   Problem List Items Addressed This Visit       Ulnar neuropathy of both upper extremities - Primary     As discussed, the best way to treat your symptoms is to avoid any maneuvers that will compress or irritate the ulnar neve. 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     I have sent over a steroid taper and placed a referral to Dr. James who treated your 's carpal tunnel. I would also recommend purchasing a supportive elbow cushion for additional support.          Relevant Medications    methylPREDNISolone (Medrol, Saurabh,) 4 mg tablets    Other Relevant Orders    Referral to Orthopaedic Surgery    Pain in both forearms     I did not feel the necessity to do any x-rays today, because I believe you have what we call ulnar neuropathy, which is when the ulnar nerve is being compressed causing the symptoms that you describe in your hands         Relevant Medications    methylPREDNISolone (Medrol, Saurabh,) 4 mg tablets    Other Relevant Orders    Referral to Orthopaedic Surgery

## 2024-08-09 NOTE — ASSESSMENT & PLAN NOTE
As discussed, the best way to treat your symptoms is to avoid any maneuvers that will compress or irritate the ulnar neve. 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     I have sent over a steroid taper and placed a referral to Dr. James who treated your 's carpal tunnel. I would also recommend purchasing a supportive elbow cushion for additional support.

## 2024-08-09 NOTE — ASSESSMENT & PLAN NOTE
I did not feel the necessity to do any x-rays today, because I believe you have what we call ulnar neuropathy, which is when the ulnar nerve is being compressed causing the symptoms that you describe in your hands

## 2024-08-15 ENCOUNTER — APPOINTMENT (OUTPATIENT)
Dept: PRIMARY CARE | Facility: CLINIC | Age: 34
End: 2024-08-15
Payer: COMMERCIAL

## 2024-08-27 ENCOUNTER — APPOINTMENT (OUTPATIENT)
Dept: ORTHOPEDIC SURGERY | Facility: CLINIC | Age: 34
End: 2024-08-27
Payer: COMMERCIAL

## 2024-08-27 DIAGNOSIS — M79.632 PAIN IN BOTH FOREARMS: ICD-10-CM

## 2024-08-27 DIAGNOSIS — G56.23 ULNAR NEUROPATHY OF BOTH UPPER EXTREMITIES: ICD-10-CM

## 2024-08-27 DIAGNOSIS — M79.631 PAIN IN BOTH FOREARMS: ICD-10-CM

## 2024-08-27 PROCEDURE — 99203 OFFICE O/P NEW LOW 30 MIN: CPT | Performed by: ORTHOPAEDIC SURGERY

## 2024-08-27 PROCEDURE — 1036F TOBACCO NON-USER: CPT | Performed by: ORTHOPAEDIC SURGERY

## 2024-08-27 ASSESSMENT — PAIN - FUNCTIONAL ASSESSMENT: PAIN_FUNCTIONAL_ASSESSMENT: NO/DENIES PAIN

## 2024-08-28 NOTE — PROGRESS NOTES
History of Present Illness:  Chief Complaint   Patient presents with    Left Wrist - Pain, Numbness    Right Wrist - Pain, Numbness       The patient presents today for evaluation of bilateral hand pain.  The patient endorses numbness and tingling (ulnar 2 digits).  There is no current neck pain or cervical spine issues.  Symptoms began about 1 month ago.  Worse with elbow flexion.  She also notices increased symptoms while driving and elbows are somewhat bent.  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  Pain radiates from the medial elbow to the small and ring fingers.  She did note some improvement with brief course of oral steroid.  No issues with gripping/strength.    Past Medical History:   Diagnosis Date    12 weeks gestation of pregnancy ([facility]) 02/17/2021    12 weeks gestation of pregnancy    16 weeks gestation of pregnancy ([facility]) 03/19/2021    16 weeks gestation of pregnancy    20 weeks gestation of pregnancy ([facility]) 04/16/2021    20 weeks gestation of pregnancy    22 weeks gestation of pregnancy ([facility]) 05/04/2021    22 weeks gestation of pregnancy    24 weeks gestation of pregnancy ([facility]) 05/17/2021    24 weeks gestation of pregnancy    28 weeks gestation of pregnancy ([facility]) 06/11/2021    28 weeks gestation of pregnancy    32 weeks gestation of pregnancy ([facility]) 07/09/2021    32 weeks gestation of pregnancy    34 weeks gestation of pregnancy ([facility]) 07/21/2021    34 weeks gestation of pregnancy    36 weeks gestation of pregnancy ([facility]) 08/06/2021    36 weeks gestation of pregnancy    37 weeks gestation of pregnancy ([facility]) 08/17/2021    37 weeks gestation of pregnancy    38 weeks gestation of pregnancy ([facility]) 08/20/2021    38 weeks gestation of pregnancy    Encounter for gynecological examination (general) (routine) without abnormal findings 03/18/2022    Well woman exam with routine gynecological exam    Encounter for immunization 07/09/2021     Encounter for immunization    Encounter for insertion of intrauterine contraceptive device 2022    Encounter for insertion of progestin-releasing intrauterine contraceptive device (IUD)    Encounter for routine postpartum follow-up ([facility]) 2021    Postpartum exam    False labor before 37 completed weeks of gestation, unspecified trimester ([facility])     Threatened premature labor, antepartum    Missed  ([facility]) 2020    Missed     Other specified health status     Known health problems: none    Pelvic and perineal pain 2021    Pelvic pain in female    Personal history of other complications of pregnancy, childbirth and the puerperium 2021    History of hyperemesis gravidarum    Personal history of other diseases of the female genital tract 10/06/2020    History of dysfunctional uterine bleeding    Personal history of other drug therapy 10/28/2022    History of influenza vaccination    Personal history of other specified conditions 2021    History of decreased fetal movements    Personal history of other specified conditions 2020    History of dysuria    Secondary amenorrhea 2021    Secondary amenorrhea    Threatened  ([facility]) 2020    , threatened, antepartum       Medication Documentation Review Audit       Reviewed by Africa Bueno CMA (Medical Assistant) on 24 at 0854      Medication Order Taking? Sig Documenting Provider Last Dose Status   atorvastatin (Lipitor) 10 mg tablet [id number]  Take 1 tablet (10 mg) by mouth once daily. Abdirashid Santos,   Active   clobetasol (Temovate) 0.05 % cream [id number] No Apply topically 2 times a day. Historical Provider, MD Taking Active   docusate sodium (Colace) 100 mg capsule [id number] No Take 1 capsule (100 mg) by mouth 2 times a day as needed for constipation. Historical Provider, MD Taking Active   ergocalciferol (Vitamin D-2) 1.25 MG (99517 UT) capsule [id number]  Take 1  capsule (50,000 Units) by mouth 1 (one) time per week. Ana Maria Soto, DO  Active   fluocinolone 0.01 % cream [id number] No Apply topically 2 times a day. Historical Provider, MD Taking Active   fluocinonide (Lidex) 0.05 % external solution [id number]   Historical Provider, MD  Active   FLUoxetine (PROzac) 20 mg tablet [id number]  Take 1 tablet (20 mg) by mouth once daily. Abdirashid Santos, DO  Active   ketoconazole (NIZOral) 2 % cream [id number] No Apply topically once daily. Historical Provider, MD Taking Active   levonorgestreL (LILETTA) 20.4 mcg/24 hrs (8 yrs) 52 mg intrauterine device [id number] No by intrauterine route 1 time. Historical Provider, MD Taking Active   methylPREDNISolone (Medrol, Saurabh,) 4 mg tablets [id number]  Follow schedule on package instructions Abdirashid Santos, DO  Active   pantoprazole (ProtoNix) 40 mg EC tablet [id number] No Take by mouth. Historical Provider, MD Taking Active   progesterone (Prometrium) 100 mg capsule [id number] No Take 1 capsule (100 mg) by mouth once daily. Historical Provider, MD Taking Active                    Allergies   Allergen Reactions    Nitrofurantoin Monohyd/M-Cryst Itching and Other     Itching palms and feet    Penicillin Other    Sulfamethoxazole-Trimethoprim Rash       Social History     Socioeconomic History    Marital status:      Spouse name: Not on file    Number of children: Not on file    Years of education: Not on file    Highest education level: Not on file   Occupational History    Not on file   Tobacco Use    Smoking status: Never    Smokeless tobacco: Never   Substance and Sexual Activity    Alcohol use: Not on file    Drug use: Not on file    Sexual activity: Not on file   Other Topics Concern    Not on file   Social History Narrative    Not on file     Social Determinants of Health     Financial Resource Strain: Low Risk  (1/10/2022)    [facility]    Overall Financial Resource Strain (CARDIA)     Difficulty of  Paying Living Expenses: Not hard at all   Food Insecurity: No Food Insecurity (1/10/2022)    Received from [facility]    Hunger Vital Sign     Worried About Running Out of Food in the Last Year: Never true     Ran Out of Food in the Last Year: Never true   Transportation Needs: No Transportation Needs (1/10/2022)    Received from [facility]    PRAPARE - Transportation     Lack of Transportation (Medical): No     Lack of Transportation (Non-Medical): No   Physical Activity: Inactive (1/10/2022)    Received from [facility]    Exercise Vital Sign     Days of Exercise per Week: 0 days     Minutes of Exercise per Session: 0 min   Stress: Stress Concern Present (1/10/2022)    Received from [facility]    [employer] - Occupational Stress Questionnaire     Feeling of Stress : To some extent   Social Connections: Moderately Isolated (1/10/2022)    Received from [facility]    Social Connection and Isolation Panel [NHANES]     Frequency of Communication with Friends and Family: More than three times a week     Frequency of Social Gatherings with Friends and Family: More than three times a week     Attends Zoroastrian Services: Never     Active Member of Clubs or Organizations: No     Attends Club or Organization Meetings: Never     Marital Status:    Intimate Partner Violence: Not on file   Housing Stability: Low Risk  (1/10/2022)    Received from [facility]    Housing Stability Vital Sign     Unable to Pay for Housing in the Last Year: No     Number of Places Lived in the Last Year: 1     Unstable Housing in the Last Year: No       Past Surgical History:   Procedure Laterality Date    OTHER SURGICAL HISTORY  03/31/2022    Nasal septoplasty    OTHER SURGICAL HISTORY  03/31/2022    [place] tooth extraction          Review of Systems   GENERAL: Negative  for malaise, significant weight loss, fever  MUSCULOSKELETAL: see HPI  NEURO:  see HPI     Physical Examination:  Constitutional: Appears well-developed and well-nourished.  Head: Normocephalic and atraumatic.  Eyes: EOMI grossly  Cardiovascular: Intact distal pulses.   Respiratory: Effort normal. No respiratory distress.  Neurologic: Alert and oriented to person, place, and time.  Skin: Skin is warm and dry.  Hematologic / Lymphatic: No lymphedema, lymphangitis.  Psychiatric: normal mood and affect. Behavior is normal.   Musculoskeletal:  Bilateral arms:  No obvious swelling or masses  No thenar atrophy  No atrophy noted of hypothenar eminence   Negative Wartenberg's  Negative Tinel's at carpal tunnel  Negative median nerve compression test  Negative Donnie's test  Slightly diminished sensation to light touch in ulnar nerve distribution  5/5 thumb Abduction, 5/5 Finger Abduction  Radial pulse palpable  Good capillary refill  Sensitivity about ulnar nerve at level of cubital tunnel.  Positive elbow flexion test        Assessment:  Patient with bilateral ulnar neuropathy with preserved strength     Plan:  We reviewed the disease process with the patient.  We discussed the role for electrodiagnostic testing as well as potential role of neuromuscular ultrasound and treatment decision making, overnight extension splinting and avoidance of prolonged elbow flexion.  She will begin with overnight towel splinting and EMG has been ordered to further assess.  Plan for follow-up after nerve study is completed for further review and management planning.

## 2024-09-09 ENCOUNTER — HOSPITAL ENCOUNTER (OUTPATIENT)
Dept: NEUROLOGY | Facility: CLINIC | Age: 34
Discharge: HOME | End: 2024-09-09
Payer: COMMERCIAL

## 2024-09-09 DIAGNOSIS — G56.23 ULNAR NEUROPATHY OF BOTH UPPER EXTREMITIES: ICD-10-CM

## 2024-09-09 PROCEDURE — 95885 MUSC TST DONE W/NERV TST LIM: CPT | Performed by: PSYCHIATRY & NEUROLOGY

## 2024-09-09 PROCEDURE — 95911 NRV CNDJ TEST 9-10 STUDIES: CPT | Performed by: PSYCHIATRY & NEUROLOGY

## 2024-09-09 PROCEDURE — 95886 MUSC TEST DONE W/N TEST COMP: CPT | Performed by: PSYCHIATRY & NEUROLOGY

## 2024-09-09 PROCEDURE — 95885 MUSC TST DONE W/NERV TST LIM: CPT | Mod: 59 | Performed by: PSYCHIATRY & NEUROLOGY

## 2024-09-16 ENCOUNTER — APPOINTMENT (OUTPATIENT)
Dept: ORTHOPEDIC SURGERY | Facility: CLINIC | Age: 34
End: 2024-09-16
Payer: COMMERCIAL

## 2024-09-16 DIAGNOSIS — G56.23 ULNAR NEUROPATHY OF BOTH UPPER EXTREMITIES: Primary | ICD-10-CM

## 2024-09-16 PROCEDURE — 1036F TOBACCO NON-USER: CPT | Performed by: ORTHOPAEDIC SURGERY

## 2024-09-16 PROCEDURE — 99442 PR PHYS/QHP TELEPHONE EVALUATION 11-20 MIN: CPT | Performed by: ORTHOPAEDIC SURGERY

## 2024-09-17 NOTE — PROGRESS NOTES
History of Present Illness:  Chief Complaint   Patient presents with    Left Wrist - Follow-up     Telephone visit- EMG review    Right Wrist - Follow-up     34-year-old female seen a few weeks ago for evaluation of bilateral hand pain consistent with ulnar neuropathy.  She reports that making some modifications with her workspace as well as avoiding prolonged elbow flexion has improved her symptoms.  She is still having some intermittent numbness and tingling into the small and ring fingers.      Past Medical History:   Diagnosis Date    12 weeks gestation of pregnancy ([facility]) 02/17/2021    12 weeks gestation of pregnancy    16 weeks gestation of pregnancy ([facility]) 03/19/2021    16 weeks gestation of pregnancy    20 weeks gestation of pregnancy ([facility]) 04/16/2021    20 weeks gestation of pregnancy    22 weeks gestation of pregnancy ([facility]) 05/04/2021    22 weeks gestation of pregnancy    24 weeks gestation of pregnancy ([facility]) 05/17/2021    24 weeks gestation of pregnancy    28 weeks gestation of pregnancy ([facility]) 06/11/2021    28 weeks gestation of pregnancy    32 weeks gestation of pregnancy ([facility]) 07/09/2021    32 weeks gestation of pregnancy    34 weeks gestation of pregnancy ([facility]) 07/21/2021    34 weeks gestation of pregnancy    36 weeks gestation of pregnancy ([facility]) 08/06/2021    36 weeks gestation of pregnancy    37 weeks gestation of pregnancy ([facility]) 08/17/2021    37 weeks gestation of pregnancy    38 weeks gestation of pregnancy ([facility]) 08/20/2021    38 weeks gestation of pregnancy    Encounter for gynecological examination (general) (routine) without abnormal findings 03/18/2022    Well woman exam with routine gynecological exam    Encounter for immunization 07/09/2021    Encounter for immunization    Encounter for insertion of intrauterine contraceptive device 09/26/2022    Encounter for insertion of progestin-releasing intrauterine contraceptive device (IUD)    Encounter for  routine postpartum follow-up ([facility]) 2021    Postpartum exam    False labor before 37 completed weeks of gestation, unspecified trimester ([facility])     Threatened premature labor, antepartum    Missed  ([facility]) 2020    Missed     Other specified health status     Known health problems: none    Pelvic and perineal pain 2021    Pelvic pain in female    Personal history of other complications of pregnancy, childbirth and the puerperium 2021    History of hyperemesis gravidarum    Personal history of other diseases of the female genital tract 10/06/2020    History of dysfunctional uterine bleeding    Personal history of other drug therapy 10/28/2022    History of influenza vaccination    Personal history of other specified conditions 2021    History of decreased fetal movements    Personal history of other specified conditions 2020    History of dysuria    Secondary amenorrhea 2021    Secondary amenorrhea    Threatened  ([facility]) 2020    , threatened, antepartum       Medication Documentation Review Audit       Reviewed by Africa Bueon CMA (Medical Assistant) on 24 at 1600      Medication Order Taking? Sig Documenting Provider Last Dose Status   atorvastatin (Lipitor) 10 mg tablet [id number]  Take 1 tablet (10 mg) by mouth once daily. Abdirashid Santos, DO  Active   clobetasol (Temovate) 0.05 % cream [id number] No Apply topically 2 times a day. Historical Provider, MD Taking Active   docusate sodium (Colace) 100 mg capsule [id number] No Take 1 capsule (100 mg) by mouth 2 times a day as needed for constipation. Historical Provider, MD Taking Active   ergocalciferol (Vitamin D-2) 1.25 MG (96513 UT) capsule [id number]  Take 1 capsule (50,000 Units) by mouth 1 (one) time per week. Ana Maria Soto, DO  Active   fluocinolone 0.01 % cream [id number] No Apply topically 2 times a day. Historical Provider, MD Taking Active   fluocinonide (Lidex)  0.05 % external solution [id number]   Historical Provider, MD  Active   FLUoxetine (PROzac) 20 mg tablet [id number]  Take 1 tablet (20 mg) by mouth once daily. Abdirashid Santos DO  Active   ketoconazole (NIZOral) 2 % cream [id number] No Apply topically once daily. Historical Provider, MD Taking Active   levonorgestreL (LILETTA) 20.4 mcg/24 hrs (8 yrs) 52 mg intrauterine device [id number] No by intrauterine route 1 time. Historical Provider, MD Taking Active   methylPREDNISolone (Medrol, Saurabh,) 4 mg tablets [id number]  Follow schedule on package instructions Abdirashid Santos, DO  Active   pantoprazole (ProtoNix) 40 mg EC tablet [id number] No Take by mouth. Historical Provider, MD Taking Active   progesterone (Prometrium) 100 mg capsule [id number] No Take 1 capsule (100 mg) by mouth once daily. Historical Provider, MD Taking Active                    Allergies   Allergen Reactions    Nitrofurantoin Monohyd/M-Cryst Itching and Other     Itching palms and feet    Penicillin Other    Sulfamethoxazole-Trimethoprim Rash       Social History     Socioeconomic History    Marital status:      Spouse name: Not on file    Number of children: Not on file    Years of education: Not on file    Highest education level: Not on file   Occupational History    Not on file   Tobacco Use    Smoking status: Never    Smokeless tobacco: Never   Substance and Sexual Activity    Alcohol use: Not on file    Drug use: Not on file    Sexual activity: Not on file   Other Topics Concern    Not on file   Social History Narrative    Not on file     Social Determinants of Health     Financial Resource Strain: Low Risk  (1/10/2022)    Received from [facility]    Overall Financial Resource Strain (CARDIA)     Difficulty of Paying Living Expenses: Not hard at all   Food Insecurity: No Food Insecurity (1/10/2022)    Received from [facility]    Hunger Vital Sign     Worried About Running Out of Food in the Last  Year: Never true     Ran Out of Food in the Last Year: Never true   Transportation Needs: No Transportation Needs (1/10/2022)    Received from [facility]    PRAPARE - Transportation     Lack of Transportation (Medical): No     Lack of Transportation (Non-Medical): No   Physical Activity: Inactive (1/10/2022)    Received from [facility]    Exercise Vital Sign     Days of Exercise per Week: 0 days     Minutes of Exercise per Session: 0 min   Stress: Stress Concern Present (1/10/2022)    Received from [facility] [employer] [facility] - Occupational Stress Questionnaire     Feeling of Stress : To some extent   Social Connections: Moderately Isolated (1/10/2022)    Received from [facility]    Social Connection and Isolation Panel [NHANES]     Frequency of Communication with Friends and Family: More than three times a week     Frequency of Social Gatherings with Friends and Family: More than three times a week     Attends [employer] Services: Never     Active Member of Clubs or Organizations: No     Attends Club or Organization Meetings: Never     Marital Status:    Intimate Partner Violence: Not on file   Housing Stability: Low Risk  (1/10/2022)    Received from [facility]    Housing Stability Vital Sign     Unable to Pay for Housing in the Last Year: No     Number of Places Lived in the Last Year: 1     Unstable Housing in the Last Year: No       Past Surgical History:   Procedure Laterality Date    OTHER SURGICAL HISTORY  03/31/2022    Nasal septoplasty    OTHER SURGICAL HISTORY  03/31/2022    [place] tooth extraction          Review of Systems   GENERAL: Negative for malaise, significant weight loss, fever  MUSCULOSKELETAL: see HPI  NEURO:  see HPI    Bilateral upper extremity EMG from September 9, 2024 available for my review is grossly normal with no evidence of ulnar  neuropathies at the level of the elbow.    Assessment:  Patient with bilateral EMG negative ulnar neuropathy with preserved strength, symptoms improved with conservative measures.     Plan:  EMG results were reviewed with patient.  Given the negative EMG findings as well as the fact that her symptoms are improving with conservative measures we will continue with conservative measures at this time, including avoidance of elbow flexion and avoidance of pressure on the medial elbow.  If she does have worsening symptoms may consider neuromuscular ultrasound for further evaluation.  She will call the office for test ordering if this becomes the case.  Questions addressed.    12 minutes reviewing studies and with phone discussion.

## 2024-10-11 ENCOUNTER — APPOINTMENT (OUTPATIENT)
Dept: OBSTETRICS AND GYNECOLOGY | Facility: CLINIC | Age: 34
End: 2024-10-11
Payer: COMMERCIAL

## 2024-10-23 ENCOUNTER — APPOINTMENT (OUTPATIENT)
Dept: PRIMARY CARE | Facility: CLINIC | Age: 34
End: 2024-10-23
Payer: COMMERCIAL

## 2024-10-23 VITALS
HEIGHT: 67 IN | SYSTOLIC BLOOD PRESSURE: 122 MMHG | DIASTOLIC BLOOD PRESSURE: 72 MMHG | BODY MASS INDEX: 28.25 KG/M2 | WEIGHT: 180 LBS | HEART RATE: 68 BPM

## 2024-10-23 DIAGNOSIS — E78.00 ELEVATED LDL CHOLESTEROL LEVEL: ICD-10-CM

## 2024-10-23 DIAGNOSIS — Z23 FLU VACCINE NEED: ICD-10-CM

## 2024-10-23 DIAGNOSIS — Z00.00 ANNUAL PHYSICAL EXAM: Primary | ICD-10-CM

## 2024-10-23 DIAGNOSIS — Z12.31 ENCOUNTER FOR SCREENING MAMMOGRAM FOR MALIGNANT NEOPLASM OF BREAST: ICD-10-CM

## 2024-10-23 DIAGNOSIS — F41.9 ANXIETY: ICD-10-CM

## 2024-10-23 LAB
POC APPEARANCE, URINE: CLEAR
POC BILIRUBIN, URINE: NEGATIVE
POC BLOOD, URINE: ABNORMAL
POC COLOR, URINE: YELLOW
POC GLUCOSE, URINE: NEGATIVE MG/DL
POC KETONES, URINE: NEGATIVE MG/DL
POC LEUKOCYTES, URINE: NEGATIVE
POC NITRITE,URINE: NEGATIVE
POC PH, URINE: 6 PH
POC PROTEIN, URINE: NEGATIVE MG/DL
POC SPECIFIC GRAVITY, URINE: 1.02
POC UROBILINOGEN, URINE: 0.2 EU/DL

## 2024-10-23 PROCEDURE — 90656 IIV3 VACC NO PRSV 0.5 ML IM: CPT | Performed by: FAMILY MEDICINE

## 2024-10-23 PROCEDURE — 99214 OFFICE O/P EST MOD 30 MIN: CPT | Performed by: FAMILY MEDICINE

## 2024-10-23 PROCEDURE — 81002 URINALYSIS NONAUTO W/O SCOPE: CPT | Performed by: FAMILY MEDICINE

## 2024-10-23 PROCEDURE — 3078F DIAST BP <80 MM HG: CPT | Performed by: FAMILY MEDICINE

## 2024-10-23 PROCEDURE — 1036F TOBACCO NON-USER: CPT | Performed by: FAMILY MEDICINE

## 2024-10-23 PROCEDURE — 3008F BODY MASS INDEX DOCD: CPT | Performed by: FAMILY MEDICINE

## 2024-10-23 PROCEDURE — 99395 PREV VISIT EST AGE 18-39: CPT | Performed by: FAMILY MEDICINE

## 2024-10-23 PROCEDURE — 3074F SYST BP LT 130 MM HG: CPT | Performed by: FAMILY MEDICINE

## 2024-10-23 PROCEDURE — 90471 IMMUNIZATION ADMIN: CPT | Performed by: FAMILY MEDICINE

## 2024-10-23 RX ORDER — ATORVASTATIN CALCIUM 10 MG/1
10 TABLET, FILM COATED ORAL DAILY
Qty: 90 TABLET | Refills: 1 | Status: SHIPPED | OUTPATIENT
Start: 2024-10-23 | End: 2025-04-21

## 2024-10-23 RX ORDER — FLUOXETINE 20 MG/1
20 TABLET ORAL DAILY
Qty: 90 TABLET | Refills: 1 | Status: SHIPPED | OUTPATIENT
Start: 2024-10-23 | End: 2025-04-21

## 2024-10-23 NOTE — ASSESSMENT & PLAN NOTE
Stable, no acute changes. Medication refilled  We will notify you upon reviewing the results of your blood work

## 2024-10-23 NOTE — ASSESSMENT & PLAN NOTE
Complete history and physical was performed  Please return for blood work at your earliest convenience when you have fasted  Please schedule your mammogram at your earliest convenience

## 2024-10-23 NOTE — PROGRESS NOTES
"Subjective   Patient ID: Babita Oconnor \"David" is a 34 y.o. female who presents for Hyperlipidemia, Anxiety, and Annual Exam.    Past Medical, Surgical, and Family History reviewed and updated in chart.    Reviewed all medications by prescribing practitioner or clinical pharmacist (such as prescriptions, OTCs, herbal therapies and supplements) and documented in the medical record.    HPI  1. Physical exam.  Pap: last done about 1 year ago, is under the care of Dr. Ortiz  Mammogram: willing to get a baseline, has not had one done yet, but she has a family history of breast cancer  Colonoscopy: due at age 43  Immunizations: up to date, willing to update flu today.  Does have a family history of colon cancer in maternal grandfather, however, she does not know the age at which he was diagnosed  Reports family history of breast cancer in mother diagnosed at age 50    2. Anxiety  Currently taking Prozac 20 mg once daily and is tolerating well  Denies SI/HI  Requesting refill    3. Hyperlipidemia  Currently taking atorvastatin 10 mg once daily and is tolerating well  Denies cardiopulmonary symptoms or myalgias  Requesting refill     Review of Systems  All pertinent positive symptoms are included in the history of present illness.    All other systems have been reviewed and are negative and noncontributory to this patient's current ailments.    Past Medical History:   Diagnosis Date    12 weeks gestation of pregnancy ([facility]) 02/17/2021    12 weeks gestation of pregnancy    16 weeks gestation of pregnancy ([facility]) 03/19/2021    16 weeks gestation of pregnancy    20 weeks gestation of pregnancy ([facility]) 04/16/2021    20 weeks gestation of pregnancy    22 weeks gestation of pregnancy ([facility]) 05/04/2021    22 weeks gestation of pregnancy    24 weeks gestation of pregnancy ([facility]) 05/17/2021    24 weeks gestation of pregnancy    28 weeks gestation of pregnancy ([facility]) 06/11/2021    28 weeks gestation of " pregnancy    32 weeks gestation of pregnancy ([facility]) 2021    32 weeks gestation of pregnancy    34 weeks gestation of pregnancy ([facility]) 2021    34 weeks gestation of pregnancy    36 weeks gestation of pregnancy ([facility]) 2021    36 weeks gestation of pregnancy    37 weeks gestation of pregnancy ([facility]) 2021    37 weeks gestation of pregnancy    38 weeks gestation of pregnancy ([facility]) 2021    38 weeks gestation of pregnancy    Encounter for gynecological examination (general) (routine) without abnormal findings 2022    Well woman exam with routine gynecological exam    Encounter for immunization 2021    Encounter for immunization    Encounter for insertion of intrauterine contraceptive device 2022    Encounter for insertion of progestin-releasing intrauterine contraceptive device (IUD)    Encounter for routine postpartum follow-up 2021    Postpartum exam    False labor before 37 completed weeks of gestation, unspecified trimester ([facility])     Threatened premature labor, antepartum    Missed  ([facility]) 2020    Missed     Other specified health status     Known health problems: none    Pelvic and perineal pain 2021    Pelvic pain in female    Personal history of other complications of pregnancy, childbirth and the puerperium 2021    History of hyperemesis gravidarum    Personal history of other diseases of the female genital tract 10/06/2020    History of dysfunctional uterine bleeding    Personal history of other drug therapy 10/28/2022    History of influenza vaccination    Personal history of other specified conditions 2021    History of decreased fetal movements    Personal history of other specified conditions 2020    History of dysuria    Secondary amenorrhea 2021    Secondary amenorrhea    Threatened  ([facility]) 2020    , threatened, antepartum     Past Surgical History:    Procedure Laterality Date    OTHER SURGICAL HISTORY  03/31/2022    Nasal septoplasty    OTHER SURGICAL HISTORY  03/31/2022    [place] tooth extraction     Social History     Tobacco Use    Smoking status: Never    Smokeless tobacco: Never     Family History   Problem Relation Name Age of Onset    Atrial fibrillation Mother      Atrial fibrillation Father      Stroke Maternal Grandmother      Macular degeneration Maternal Grandmother       Immunization History   Administered Date(s) Administered    DTP 1988, 1988, 1988, 10/06/1989, 04/02/1993    Flu vaccine (IIV4), preservative free *Check age/dose* 09/15/2017, 09/20/2018, 10/28/2022    Flu vaccine, quadrivalent, no egg protein, age 6 month or greater (FLUCELVAX) 01/09/2022, 10/19/2023    Flu vaccine, trivalent, preservative free, age 6 months and greater (Fluarix/Fluzone/Flulaval) 11/01/2013, 10/23/2024    HPV, Quadrivalent 02/28/2007, 04/27/2007, 08/28/2007    Hepatitis B vaccine, 19 yrs and under (RECOMBIVAX, ENGERIX) 08/01/2001, 09/07/2001, 05/11/2002    Hib (HbOC) 11/03/1989    Influenza, Unspecified 10/01/2011    Influenza, injectable, quadrivalent 01/31/2020    Influenza, seasonal, injectable 09/11/2015, 09/24/2016    MMR vaccine, subcutaneous (MMR II) 07/05/1989, 06/30/2000    Meningococcal MPSV4 01/11/2006    OPV 1988, 1988, 1988, 04/06/1990, 04/02/1993    PPD Test 06/09/2009, 06/16/2009, 01/11/2011, 12/19/2011    Pfizer Purple Cap SARS-CoV-2 05/21/2021, 06/11/2021, 01/09/2022    Td vaccine, age 7 years and older (TDVAX) 06/30/2000    Tdap vaccine, age 7 year and older (BOOSTRIX, ADACEL) 03/04/2009, 07/09/2021    Varicella vaccine, subcutaneous (VARIVAX) 04/27/1995     Current Outpatient Medications   Medication Instructions    atorvastatin (LIPITOR) 10 mg, oral, Daily    clobetasol (Temovate) 0.05 % cream Topical, 2 times daily    docusate sodium (COLACE) 100 mg, oral, 2 times daily PRN    ergocalciferol (VITAMIN D-2)  "50,000 Units, oral, Once Weekly    fluocinolone 0.01 % cream Topical, 2 times daily    fluocinonide (Lidex) 0.05 % external solution     FLUoxetine (PROZAC) 20 mg, oral, Daily    ketoconazole (NIZOral) 2 % cream Topical, Daily    levonorgestreL (LILETTA) 20.4 mcg/24 hrs (8 yrs) 52 mg intrauterine device intrauterine, Once    methylPREDNISolone (Medrol, Saurabh,) 4 mg tablets Follow schedule on package instructions    pantoprazole (ProtoNix) 40 mg EC tablet oral    progesterone (PROMETRIUM) 100 mg, oral, Daily     Allergies   Allergen Reactions    Nitrofurantoin Monohyd/M-Cryst Itching and Other     Itching palms and feet    Penicillin Other    Sulfamethoxazole-Trimethoprim Rash       Objective   Vitals:    10/23/24 0750   BP: 122/72   Pulse: 68   Weight: 81.6 kg (180 lb)   Height: 1.707 m (5' 7.2\")     Body mass index is 28.02 kg/m².    BP Readings from Last 3 Encounters:   10/23/24 122/72   08/09/24 100/64   02/27/24 117/79      Wt Readings from Last 3 Encounters:   10/23/24 81.6 kg (180 lb)   08/09/24 80.3 kg (177 lb)   02/27/24 75.9 kg (167 lb 4.8 oz)      Office Visit on 10/23/2024   Component Date Value    POC Color, Urine 10/23/2024 Yellow     POC Appearance, Urine 10/23/2024 Clear     POC Glucose, Urine 10/23/2024 NEGATIVE     POC Bilirubin, Urine 10/23/2024 NEGATIVE     POC Ketones, Urine 10/23/2024 NEGATIVE     POC Specific Gravity, Ur* 10/23/2024 1.025     POC Blood, Urine 10/23/2024 MODERATE (2+) (A)     POC PH, Urine 10/23/2024 6.0     POC Protein, Urine 10/23/2024 NEGATIVE     POC Urobilinogen, Urine 10/23/2024 0.2     Poc Nitrite, Urine 10/23/2024 NEGATIVE     POC Leukocytes, Urine 10/23/2024 NEGATIVE      Physical Exam  CONSTITUTIONAL - well nourished, well developed, looks like stated age, in no acute distress, not ill-appearing, and not tired appearing  SKIN - normal skin color and pigmentation, normal skin turgor without rash, lesions, or nodules visualized  HEAD - no trauma, normocephalic  EYES - " normal external exam  ENT - TM's intact, no injection, no signs of infection, uvula midline, normal tongue movement and throat normal, no exudate  NECK - supple without rigidity, no neck mass was observed, no thyromegaly or thyroid nodules  CHEST - clear to auscultation, no wheezing, no crackles and no rales, good effort  CARDIAC - regular rate and regular rhythm, no skipped beats, no murmur  ABDOMEN - no organomegaly, soft, nontender, nondistended, normal bowel sounds, no guarding/rebound/rigidity  EXTREMITIES - no obvious or evident edema, no obvious or evident deformities  NEUROLOGICAL - normal gait, normal balance, normal motor, no ataxia, DTRs equal and symmetrical; alert, oriented and no focal signs  PSYCHIATRIC - alert, pleasant and cordial, age-appropriate  IMMUNOLOGIC - no cervical lymphadenopathy    Assessment/Plan   Problem List Items Addressed This Visit       Elevated LDL cholesterol level     Stable, no acute changes. Medication refilled  We will notify you upon reviewing the results of your blood work         Relevant Medications    atorvastatin (Lipitor) 10 mg tablet    Other Relevant Orders    Lipid panel    Comprehensive metabolic panel    Anxiety     Stable, no acute changes. Medication refilled.         Relevant Medications    FLUoxetine (PROzac) 20 mg tablet    Annual physical exam - Primary     Complete history and physical was performed  Please return for blood work at your earliest convenience when you have fasted  Please schedule your mammogram at your earliest convenience         Relevant Orders    Lipid panel    CBC and Auto Differential    Comprehensive metabolic panel    POCT UA (nonautomated) manually resulted (Completed)     Other Visit Diagnoses       Encounter for screening mammogram for malignant neoplasm of breast        Please schedule this at your earliest convenience    Relevant Orders    BI mammo bilateral screening tomosynthesis    Flu vaccine need        All questions were  answered and you were counseled on immunization(s) in detail and vaccination was provided    Relevant Orders    Flu vaccine, trivalent, preservative free, age 6 months and greater (Fluarix/Fluzone/Flulaval) (Completed)

## 2024-10-24 ENCOUNTER — LAB (OUTPATIENT)
Dept: LAB | Facility: LAB | Age: 34
End: 2024-10-24
Payer: COMMERCIAL

## 2024-10-24 DIAGNOSIS — Z00.00 ANNUAL PHYSICAL EXAM: ICD-10-CM

## 2024-10-24 DIAGNOSIS — E78.00 ELEVATED LDL CHOLESTEROL LEVEL: ICD-10-CM

## 2024-10-24 LAB
ALBUMIN SERPL BCP-MCNC: 4.1 G/DL (ref 3.4–5)
ALP SERPL-CCNC: 95 U/L (ref 33–110)
ALT SERPL W P-5'-P-CCNC: 15 U/L (ref 7–45)
ANION GAP SERPL CALC-SCNC: 10 MMOL/L (ref 10–20)
AST SERPL W P-5'-P-CCNC: 15 U/L (ref 9–39)
BASOPHILS # BLD AUTO: 0.05 X10*3/UL (ref 0–0.1)
BASOPHILS NFR BLD AUTO: 1.1 %
BILIRUB SERPL-MCNC: 0.5 MG/DL (ref 0–1.2)
BUN SERPL-MCNC: 11 MG/DL (ref 6–23)
CALCIUM SERPL-MCNC: 9 MG/DL (ref 8.6–10.3)
CHLORIDE SERPL-SCNC: 105 MMOL/L (ref 98–107)
CHOLEST SERPL-MCNC: 180 MG/DL (ref 0–199)
CHOLESTEROL/HDL RATIO: 2.9
CO2 SERPL-SCNC: 28 MMOL/L (ref 21–32)
CREAT SERPL-MCNC: 0.78 MG/DL (ref 0.5–1.05)
EGFRCR SERPLBLD CKD-EPI 2021: >90 ML/MIN/1.73M*2
EOSINOPHIL # BLD AUTO: 0.11 X10*3/UL (ref 0–0.7)
EOSINOPHIL NFR BLD AUTO: 2.3 %
ERYTHROCYTE [DISTWIDTH] IN BLOOD BY AUTOMATED COUNT: 12.7 % (ref 11.5–14.5)
GLUCOSE SERPL-MCNC: 79 MG/DL (ref 74–99)
HCT VFR BLD AUTO: 41.8 % (ref 36–46)
HDLC SERPL-MCNC: 62.9 MG/DL
HGB BLD-MCNC: 13.7 G/DL (ref 12–16)
IMM GRANULOCYTES # BLD AUTO: 0.02 X10*3/UL (ref 0–0.7)
IMM GRANULOCYTES NFR BLD AUTO: 0.4 % (ref 0–0.9)
LDLC SERPL CALC-MCNC: 90 MG/DL
LYMPHOCYTES # BLD AUTO: 1.19 X10*3/UL (ref 1.2–4.8)
LYMPHOCYTES NFR BLD AUTO: 25.2 %
MCH RBC QN AUTO: 30.2 PG (ref 26–34)
MCHC RBC AUTO-ENTMCNC: 32.8 G/DL (ref 32–36)
MCV RBC AUTO: 92 FL (ref 80–100)
MONOCYTES # BLD AUTO: 0.4 X10*3/UL (ref 0.1–1)
MONOCYTES NFR BLD AUTO: 8.5 %
NEUTROPHILS # BLD AUTO: 2.95 X10*3/UL (ref 1.2–7.7)
NEUTROPHILS NFR BLD AUTO: 62.5 %
NON HDL CHOLESTEROL: 117 MG/DL (ref 0–149)
NRBC BLD-RTO: 0 /100 WBCS (ref 0–0)
PLATELET # BLD AUTO: 318 X10*3/UL (ref 150–450)
POTASSIUM SERPL-SCNC: 4.4 MMOL/L (ref 3.5–5.3)
PROT SERPL-MCNC: 6.6 G/DL (ref 6.4–8.2)
RBC # BLD AUTO: 4.54 X10*6/UL (ref 4–5.2)
SODIUM SERPL-SCNC: 139 MMOL/L (ref 136–145)
TRIGL SERPL-MCNC: 138 MG/DL (ref 0–149)
VLDL: 28 MG/DL (ref 0–40)
WBC # BLD AUTO: 4.7 X10*3/UL (ref 4.4–11.3)

## 2024-10-24 PROCEDURE — 36415 COLL VENOUS BLD VENIPUNCTURE: CPT

## 2024-10-24 PROCEDURE — 80061 LIPID PANEL: CPT

## 2024-10-24 PROCEDURE — 80053 COMPREHEN METABOLIC PANEL: CPT

## 2024-10-24 PROCEDURE — 85025 COMPLETE CBC W/AUTO DIFF WBC: CPT

## 2024-10-25 ENCOUNTER — APPOINTMENT (OUTPATIENT)
Dept: OBSTETRICS AND GYNECOLOGY | Facility: CLINIC | Age: 34
End: 2024-10-25
Payer: COMMERCIAL

## 2024-10-25 NOTE — RESULT ENCOUNTER NOTE
Your blood work looks excellent across-the-board  No changes to medication recommended  Keep up the great work!

## 2024-10-30 ENCOUNTER — HOSPITAL ENCOUNTER (OUTPATIENT)
Dept: RADIOLOGY | Facility: HOSPITAL | Age: 34
Discharge: HOME | End: 2024-10-30
Payer: COMMERCIAL

## 2024-10-30 VITALS — BODY MASS INDEX: 27.47 KG/M2 | HEIGHT: 67 IN | WEIGHT: 175 LBS

## 2024-10-30 DIAGNOSIS — Z12.31 ENCOUNTER FOR SCREENING MAMMOGRAM FOR MALIGNANT NEOPLASM OF BREAST: ICD-10-CM

## 2024-10-30 PROCEDURE — 77063 BREAST TOMOSYNTHESIS BI: CPT

## 2024-10-30 PROCEDURE — 77067 SCR MAMMO BI INCL CAD: CPT

## 2024-11-22 ENCOUNTER — APPOINTMENT (OUTPATIENT)
Dept: OBSTETRICS AND GYNECOLOGY | Facility: CLINIC | Age: 34
End: 2024-11-22
Payer: COMMERCIAL

## 2024-12-02 ENCOUNTER — APPOINTMENT (OUTPATIENT)
Dept: OBSTETRICS AND GYNECOLOGY | Facility: CLINIC | Age: 34
End: 2024-12-02
Payer: COMMERCIAL

## 2024-12-09 ENCOUNTER — APPOINTMENT (OUTPATIENT)
Dept: OBSTETRICS AND GYNECOLOGY | Facility: CLINIC | Age: 34
End: 2024-12-09
Payer: COMMERCIAL

## 2024-12-09 VITALS
HEIGHT: 67 IN | DIASTOLIC BLOOD PRESSURE: 60 MMHG | BODY MASS INDEX: 28.41 KG/M2 | WEIGHT: 181 LBS | SYSTOLIC BLOOD PRESSURE: 104 MMHG

## 2024-12-09 DIAGNOSIS — Z01.419 WELL WOMAN EXAM WITH ROUTINE GYNECOLOGICAL EXAM: Primary | ICD-10-CM

## 2024-12-09 DIAGNOSIS — L90.0 LICHEN SCLEROSUS: ICD-10-CM

## 2024-12-09 PROCEDURE — 3008F BODY MASS INDEX DOCD: CPT | Performed by: OBSTETRICS & GYNECOLOGY

## 2024-12-09 PROCEDURE — 87624 HPV HI-RISK TYP POOLED RSLT: CPT

## 2024-12-09 PROCEDURE — 3078F DIAST BP <80 MM HG: CPT | Performed by: OBSTETRICS & GYNECOLOGY

## 2024-12-09 PROCEDURE — 3074F SYST BP LT 130 MM HG: CPT | Performed by: OBSTETRICS & GYNECOLOGY

## 2024-12-09 PROCEDURE — 1036F TOBACCO NON-USER: CPT | Performed by: OBSTETRICS & GYNECOLOGY

## 2024-12-09 PROCEDURE — 99395 PREV VISIT EST AGE 18-39: CPT | Performed by: OBSTETRICS & GYNECOLOGY

## 2024-12-09 RX ORDER — BENZONATATE 200 MG/1
1 CAPSULE ORAL EVERY 8 HOURS PRN
COMMUNITY
Start: 2024-12-01

## 2024-12-09 RX ORDER — CLOBETASOL PROPIONATE 0.5 MG/G
CREAM TOPICAL 2 TIMES DAILY
Qty: 30 G | Refills: 1 | Status: SHIPPED | OUTPATIENT
Start: 2024-12-09

## 2024-12-09 RX ORDER — NAPROXEN 500 MG/1
1 TABLET ORAL
COMMUNITY
Start: 2024-12-01

## 2024-12-09 RX ORDER — IPRATROPIUM BROMIDE 21 UG/1
SPRAY, METERED NASAL
COMMUNITY
Start: 2024-12-01

## 2024-12-09 ASSESSMENT — LIFESTYLE VARIABLES
SKIP TO QUESTIONS 9-10: 1
AUDIT-C TOTAL SCORE: 2
HOW OFTEN DO YOU HAVE SIX OR MORE DRINKS ON ONE OCCASION: NEVER
HOW MANY STANDARD DRINKS CONTAINING ALCOHOL DO YOU HAVE ON A TYPICAL DAY: 1 OR 2
HOW OFTEN DO YOU HAVE A DRINK CONTAINING ALCOHOL: 2-4 TIMES A MONTH

## 2024-12-09 NOTE — PROGRESS NOTES
Chief Complaint   Patient presents with    Annual Exam     Annual Exam with Pap Smear     [id number]    C/O irregular menstrual cycles x4-5 months.  The cycles are now every 22 days.  Would like to discuss possible tubal ligation as well.  Request refill of Lotrisone cream.    Chaperone declined.       Patient presents for annual preventative exam.  Daughter is 3 years old.  Feels her family is complete and she and her  are contemplating vasectomy versus tubal ligation.  Discussed at length.  Discussed bilateral salpingectomy.  Patient to consider and let me know.    REVIEW OF SYSTEMS    Please see HPI for reported pertinent positives, which would supersede this ROS    Constitutional:  Denies fever, chills, wt gain or loss, fatigue    Genito-Urinary:  Denies genital lesion or sores, vaginal dryness, itching  or pain.  No abnormal vaginal discharge or unexplained vaginal bleeding.  No dysuria, urinary incontinence or frequency.  Denies pelvic pain, dysmenorrhea or dyspareunia.    Eyes:  Denies vision changes, dryness  ENT:  No hearing loss, sinus pain or congestion, nosebleeds  Cardiovascular:  No chest pain or palpitations  Respiratory:  No SOB, cough, wheezing  GI:  No Nausea, vomiting, diarrhea, constipation, abdominal pain  Musculoskeletal:  No new back pain. joint pain, peripheral edema  Skin:  No rash or skin lesion  Neurologic:  No HA, numbness or dizziness  Psychiatric:  No new anxiety or depression  Endocrine:  No loss of hair or hirsutism  Hematologic/lymphatic:  No swollen lymph nodes.  No reported tendency for easy bruising or bleeding    Patient admits to no other systemic complaints      Vitals:    24 1420   BP: 104/60     PHYSICAL EXAM:    PSYCH:  Pt is alert, oriented and cooperative    SKIN: warm, dry, w/o lesion    HEAD AND FACE:  External inspection of eyes, ears, functional cranial nerves normal and intact    THYROID:  No thyromegaly    CARDIOVASCULAR:  Warm and well  Perfused    PULMONARY:  No respiratory distress    ABDOMEN:  soft, nontender.  No mass or organomegaly.      BREAST:  Breasts are symmetric to inspection and palpation.  No mass palpable bilaterally.  There is no axillary lymphadenopathy    PELVIC:    External genitalia, urethra, perianal region normal to inspection and palpation if indicated.  No inguinal LA    Vagina without lesions.    Cervix seen and visually normal      Bimanual exam:      No pelvic mass palpable    Uterus nontender, midline in pelvis    No adnexal masses or tenderness    Assessment/Plan    Diagnoses and all orders for this visit:  Well woman exam with routine gynecological exam  -     THINPREP PAP TEST  Lichen sclerosus  -     clobetasol (Temovate) 0.05 % cream; Apply topically 2 times a day.

## 2024-12-24 ENCOUNTER — E-VISIT (OUTPATIENT)
Dept: PRIMARY CARE | Facility: CLINIC | Age: 34
End: 2024-12-24
Payer: COMMERCIAL

## 2024-12-24 DIAGNOSIS — R30.0 DYSURIA: Primary | ICD-10-CM

## 2024-12-24 PROCEDURE — 99421 OL DIG E/M SVC 5-10 MIN: CPT | Performed by: FAMILY MEDICINE

## 2024-12-24 RX ORDER — CEPHALEXIN 500 MG/1
500 CAPSULE ORAL 3 TIMES DAILY
Qty: 21 CAPSULE | Refills: 0 | Status: SHIPPED | OUTPATIENT
Start: 2024-12-24 | End: 2024-12-31

## 2024-12-24 NOTE — TELEPHONE ENCOUNTER
UTI    Has had symptoms like this in past  Dysuria  Frequency  Urgency  Clear urine    Chief Complaint: urinary symptoms  Sxs have persisted for 1 days  Dysuria?Yes  Frequency? Yes  Urgency? Yes  Blood in urine? No    Fever, chills, body aches? No  N,V,Diarrhea? No    Abnormal vaginal bleeding? No  Abnormal vaginal pain? No  Abnormal vaginal discharge No    Flank pain? No  Abdominal pain?  No    H/o kidney stones?No  H/o kidney infection? No  All other ROS (-)    Hi! I’m allergic to bactrim and macrobid. I take Prozac and Lipitor daily. They started on 12/23/24. I’ve had UTIs in the past and the symptoms are similar. 12/10 was the start of my last cycle so no chance of pregnancy.       Dysuria  Check urine culture  No  Take antibiotic as directed  Rest and drink plenty of fluids  Follow up If no improvement or if symptoms worsen in 48 hours OR if symptoms persist after completing the antibiotics OR if you develop fevers, severe back or abdominal pain or any other concerning symptoms.     Keflex 500 TID x 7 days

## 2025-02-19 ENCOUNTER — APPOINTMENT (OUTPATIENT)
Dept: PRIMARY CARE | Facility: CLINIC | Age: 35
End: 2025-02-19
Payer: COMMERCIAL

## 2025-02-23 NOTE — PROGRESS NOTES
"Chief Complaint  Patient ID: Babita Oconnor \"David" is a 34 y.o. female who presents for No chief complaint on file..              History of Present Illness  1.  Anxiety  Currently taking Prozac 20 mg once daily and is tolerating well  Denies SI/HI  Requesting refill    Review of Systems  All pertinent positive symptoms are included in the history of present illness.    All other systems have been reviewed and are negative and noncontributory to this patient's current ailments.    Past Medical History  She has a past medical history of 12 weeks gestation of pregnancy ([facility]) (2021), 16 weeks gestation of pregnancy ([facility]) (2021), 20 weeks gestation of pregnancy ([facility]) (2021), 22 weeks gestation of pregnancy ([facility]) (2021), 24 weeks gestation of pregnancy ([facility]) (2021), 28 weeks gestation of pregnancy ([facility]) (2021), 32 weeks gestation of pregnancy ([facility]) (2021), 34 weeks gestation of pregnancy ([facility]) (2021), 36 weeks gestation of pregnancy ([facility]) (2021), 37 weeks gestation of pregnancy ([facility]) (2021), 38 weeks gestation of pregnancy ([facility]) (2021), Encounter for gynecological examination (general) (routine) without abnormal findings (2022), Encounter for immunization (2021), Encounter for insertion of intrauterine contraceptive device (2022), Encounter for routine postpartum follow-up (2021), False labor before 37 completed weeks of gestation, unspecified trimester ([facility]), Missed  ([facility]) (2020), Other specified health status, Pelvic and perineal pain (2021), Personal history of other complications of pregnancy, childbirth and the puerperium (2021), Personal history of other diseases of the female genital tract (10/06/2020), Personal history of other drug therapy (10/28/2022), Personal history of other specified conditions (2021), Personal history of other " specified conditions (2020), Secondary amenorrhea (2021), and Threatened  ([facility]) (2020).    Surgical History  She has a past surgical history that includes Other surgical history (2022) and Other surgical history (2022).     Social History  She reports that she has never smoked. She has never used smokeless tobacco. She reports current alcohol use. She reports that she does not use drugs.    Family History  She indicated that the status of her mother is unknown. She indicated that the status of her father is unknown. She indicated that the status of her maternal grandmother is unknown.    Outpatient Medications Prior to Visit   Medication Sig Dispense Refill    atorvastatin (Lipitor) 10 mg tablet Take 1 tablet (10 mg) by mouth once daily. 90 tablet 1    clobetasol (Temovate) 0.05 % cream Apply topically 2 times a day. 30 g 1    fluocinolone 0.01 % cream Apply topically 2 times a day.      fluocinonide (Lidex) 0.05 % external solution       FLUoxetine (PROzac) 20 mg tablet Take 1 tablet (20 mg) by mouth once daily. 90 tablet 1    pantoprazole (ProtoNix) 40 mg EC tablet Take by mouth.       No facility-administered medications prior to visit.     Allergies  Nitrofurantoin monohyd/m-cryst and Sulfamethoxazole-trimethoprim    Immunization History   Administered Date(s) Administered    COVID-19, mRNA, LNP-S, PF, 30 mcg/0.3 mL dose 2021, 2021, 2022    DTP 1988, 1988, 1988, 10/06/1989, 1993    Flu vaccine (IIV4), preservative free *Check age/dose* 09/15/2017, 2018, 10/28/2022    Flu vaccine, quadrivalent, no egg protein, age 6 month or greater (FLUCELVAX) 2022, 10/19/2023    Flu vaccine, trivalent, preservative free, age 6 months and greater (Fluarix/Fluzone/Flulaval) 2013, 10/23/2024    HPV, Quadrivalent 2007, 2007, 2007    Hepatitis B vaccine, 19 yrs and under (RECOMBIVAX, ENGERIX) 2001,  09/07/2001, 05/11/2002    Hib (HbOC) 11/03/1989    Influenza, Unspecified 10/01/2011    Influenza, injectable, quadrivalent 01/31/2020    Influenza, seasonal, injectable 09/11/2015, 09/24/2016    MMR vaccine, subcutaneous (MMR II) 07/05/1989, 06/30/2000    Meningococcal MPSV4 01/11/2006    OPV 1988, 1988, 1988, 04/06/1990, 04/02/1993    PPD Test 06/09/2009, 06/16/2009, 01/11/2011, 12/19/2011    Td vaccine, age 7 years and older (TDVAX) 06/30/2000    Tdap vaccine, age 7 year and older (BOOSTRIX, ADACEL) 03/04/2009, 07/09/2021    Varicella vaccine, subcutaneous (VARIVAX) 04/27/1995     Objective   Visit Vitals  OB Status Having periods   Smoking Status Never        BP Readings from Last 3 Encounters:   12/09/24 104/60   10/23/24 122/72   08/09/24 100/64      Wt Readings from Last 3 Encounters:   12/09/24 82.1 kg (181 lb)   10/30/24 79.4 kg (175 lb)   10/23/24 81.6 kg (180 lb)      Vision  No results found.    Relevant Results  No visits with results within 1 Month(s) from this visit.   Latest known visit with results is:   Office Visit on 12/09/2024   Component Date Value    Case Report 12/09/2024                      Value:Gynecologic Cytology                              Case: [id number]                                   Authorizing Provider:  Britton Ortiz MD     Collected:           12/09/2024 1509              Ordering Location:     [facility]       Received:            12/09/2024 1509              First Screen:          ROSA Parrish                                                             Rescreen:              ROSA Aviles                                                          Specimen:    ThinPrep Liquid-Based Pap-Imaging System Screen, CERVIX, SCREENING                         Final Cytological Interp* 12/09/2024                      Value:    A. THINPREP PAP CERVIX, SCREENING -     Specimen Adequacy  Satisfactory for evaluation; endocervical/transformation  zone component is present  Quality Indicator: Partially obscuring blood    General Categorization  Negative for intraepithelial lesion or malignancy.    Descriptive Interpretation  Negative for intraepithelial lesion or malignancy              12/09/2024                      Value:Slide(s) initially screened by ROSA Parrish at [facility] [address] [id number]  QC [facility] [facility] [address]  By the signature on this report, the individual or group listed as making the Final Interpretation/Diagnosis certifies that they have reviewed this case.       ThinPrep Imaging System 12/09/2024                      Value:This specimen has been analyzed by the ThinPrep Imaging System ([employer] an automated imaging and review system, which assists the laboratory in evaluating cells on ThinPrep Pap tests. Following automated imaging, selected fields from every slide were reviewed by a cytotechnologist and/or pathologist.        Educational Note 12/09/2024                      Value:Cervical cytology is a screening procedure primarily for squamous cancers and precursors and has associated false-negative and false-positives results as evidenced by published data. Your patient's test should be interpreted in this context, together with the patient's history and clinical findings. Regular sampling and follow-up of unexplained clinical signs and symptoms are recommended to minimize false negative results.      Perform HPV HR test? 12/09/2024 Always (all interpretations)     Include HPV Genotype? 12/09/2024 Yes     LMP 12/09/2024 11/16/2024     HPV, high-risk 12/09/2024 Negative     HPV Type 16 DNA 12/09/2024 Negative     HPV Type 18 DNA 12/09/2024 Negative     HPV non-Type 16 or 18 DNA 12/09/2024 Negative      The ASCVD Risk score (Rishi DK, et al., 2019) failed to calculate for the following reasons:    The 2019 ASCVD  "risk score is only valid for ages 40 to 79    Physical Exam  CONSTITUTIONAL - well nourished, well developed, looks like stated age, in no acute distress, not ill-appearing, and not tired appearing  SKIN - normal skin color and pigmentation, normal skin turgor without rash, lesions, or nodules visualized  HEAD - no trauma, normocephalic  EYES - pupils are equal and reactive to light, extraocular muscles are intact, and normal external exam  CHEST - clear to auscultation, no wheezing, no crackles and no rales, good effort  CARDIAC - regular rate and regular rhythm, no skipped beats, no murmur  EXTREMITIES - no obvious or evident edema, no obvious or evident deformities  NEUROLOGICAL -  alert, oriented and no focal signs  PSYCHIATRIC - alert, pleasant and cordial, age-appropriate  Assessment & Plan  Anxiety           Summary of Meds after Encounter  There are no discontinued medications.   I am having Babita Oconnor \"Emily\" maintain her pantoprazole, fluocinolone, fluocinonide, FLUoxetine, atorvastatin, and clobetasol.  " "ALPRAZolam (Xanax) 0.5 mg tablet; Take 1/2-1 tablet BID prn    Summary of Meds after Encounter  There are no discontinued medications.   I am having Babita Oconnor \"Emily\" start on ALPRAZolam. I am also having her maintain her pantoprazole, fluocinolone, fluocinonide, FLUoxetine, atorvastatin, and clobetasol.  "

## 2025-02-24 ENCOUNTER — APPOINTMENT (OUTPATIENT)
Dept: PRIMARY CARE | Facility: CLINIC | Age: 35
End: 2025-02-24
Payer: COMMERCIAL

## 2025-02-24 VITALS
SYSTOLIC BLOOD PRESSURE: 126 MMHG | BODY MASS INDEX: 28.25 KG/M2 | HEIGHT: 67 IN | DIASTOLIC BLOOD PRESSURE: 80 MMHG | WEIGHT: 180 LBS | HEART RATE: 66 BPM

## 2025-02-24 DIAGNOSIS — F41.9 ANXIETY: Primary | ICD-10-CM

## 2025-02-24 DIAGNOSIS — F51.05 INSOMNIA SECONDARY TO ANXIETY: Primary | ICD-10-CM

## 2025-02-24 DIAGNOSIS — Z79.899 MEDICATION MANAGEMENT: ICD-10-CM

## 2025-02-24 PROCEDURE — 3008F BODY MASS INDEX DOCD: CPT | Performed by: FAMILY MEDICINE

## 2025-02-24 PROCEDURE — 1036F TOBACCO NON-USER: CPT | Performed by: FAMILY MEDICINE

## 2025-02-24 PROCEDURE — 99214 OFFICE O/P EST MOD 30 MIN: CPT | Performed by: FAMILY MEDICINE

## 2025-02-24 PROCEDURE — 3079F DIAST BP 80-89 MM HG: CPT | Performed by: FAMILY MEDICINE

## 2025-02-24 PROCEDURE — 3074F SYST BP LT 130 MM HG: CPT | Performed by: FAMILY MEDICINE

## 2025-02-24 RX ORDER — ALPRAZOLAM 0.5 MG/1
TABLET ORAL
Qty: 30 TABLET | Refills: 0 | Status: SHIPPED | OUTPATIENT
Start: 2025-02-24

## 2025-02-25 LAB
AMPHETAMINES UR QL: NEGATIVE NG/ML
BARBITURATES UR QL: NEGATIVE NG/ML
BENZODIAZ UR QL: NEGATIVE NG/ML
BZE UR QL: NEGATIVE NG/ML
CREAT UR-MCNC: 25.1 MG/DL
METHADONE UR QL: NEGATIVE NG/ML
OPIATES UR QL: NEGATIVE NG/ML
OXIDANTS UR QL: NEGATIVE MCG/ML
OXYCODONE UR QL: NEGATIVE NG/ML
PCP UR QL: NEGATIVE NG/ML
PH UR: 7.5 [PH] (ref 4.5–9)
QUEST NOTES AND COMMENTS: NORMAL
THC UR QL: NEGATIVE NG/ML

## 2025-04-01 DIAGNOSIS — K21.9 GERD WITHOUT ESOPHAGITIS: ICD-10-CM

## 2025-04-01 RX ORDER — PANTOPRAZOLE SODIUM 40 MG/1
40 TABLET, DELAYED RELEASE ORAL
Qty: 90 TABLET | Refills: 1 | Status: SHIPPED | OUTPATIENT
Start: 2025-04-01 | End: 2025-09-28

## 2025-04-03 DIAGNOSIS — E78.00 ELEVATED LDL CHOLESTEROL LEVEL: ICD-10-CM

## 2025-04-03 RX ORDER — ATORVASTATIN CALCIUM 10 MG/1
10 TABLET, FILM COATED ORAL DAILY
Qty: 90 TABLET | Refills: 0 | Status: SHIPPED | OUTPATIENT
Start: 2025-04-03 | End: 2025-07-02

## 2025-04-09 DIAGNOSIS — F41.9 ANXIETY: ICD-10-CM

## 2025-04-10 ENCOUNTER — PATIENT MESSAGE (OUTPATIENT)
Dept: PRIMARY CARE | Facility: CLINIC | Age: 35
End: 2025-04-10
Payer: COMMERCIAL

## 2025-04-10 DIAGNOSIS — F41.9 ANXIETY: Primary | ICD-10-CM

## 2025-04-10 DIAGNOSIS — F41.9 ANXIETY: ICD-10-CM

## 2025-04-10 RX ORDER — FLUOXETINE 20 MG/1
20 TABLET ORAL DAILY
Qty: 90 TABLET | Refills: 1 | Status: SHIPPED | OUTPATIENT
Start: 2025-04-10 | End: 2025-10-07

## 2025-04-10 RX ORDER — FLUOXETINE 20 MG/1
20 TABLET ORAL DAILY
Qty: 90 TABLET | Refills: 3 | OUTPATIENT
Start: 2025-04-10

## 2025-04-21 RX ORDER — FLUOXETINE 20 MG/1
20 TABLET ORAL DAILY
Qty: 90 TABLET | Refills: 1 | Status: SHIPPED | OUTPATIENT
Start: 2025-04-21 | End: 2025-10-18

## 2025-04-21 RX ORDER — FLUOXETINE HYDROCHLORIDE 20 MG/1
CAPSULE ORAL
Refills: 0 | OUTPATIENT
Start: 2025-04-21

## 2025-04-24 ENCOUNTER — PREP FOR PROCEDURE (OUTPATIENT)
Dept: OBSTETRICS AND GYNECOLOGY | Facility: CLINIC | Age: 35
End: 2025-04-24

## 2025-04-24 ENCOUNTER — APPOINTMENT (OUTPATIENT)
Dept: PRIMARY CARE | Facility: CLINIC | Age: 35
End: 2025-04-24
Payer: COMMERCIAL

## 2025-04-24 VITALS
OXYGEN SATURATION: 99 % | HEART RATE: 92 BPM | DIASTOLIC BLOOD PRESSURE: 70 MMHG | BODY MASS INDEX: 28.88 KG/M2 | WEIGHT: 184.4 LBS | SYSTOLIC BLOOD PRESSURE: 104 MMHG

## 2025-04-24 DIAGNOSIS — M62.830 SPASM OF THORACIC BACK MUSCLE: ICD-10-CM

## 2025-04-24 DIAGNOSIS — M54.6 ACUTE RIGHT-SIDED THORACIC BACK PAIN: Primary | ICD-10-CM

## 2025-04-24 DIAGNOSIS — R31.21 ASYMPTOMATIC MICROSCOPIC HEMATURIA: ICD-10-CM

## 2025-04-24 DIAGNOSIS — Z30.9 ENCOUNTER FOR CONTRACEPTIVE MANAGEMENT, UNSPECIFIED TYPE: Primary | ICD-10-CM

## 2025-04-24 LAB
POC APPEARANCE, URINE: CLEAR
POC BILIRUBIN, URINE: NEGATIVE
POC BLOOD, URINE: ABNORMAL
POC COLOR, URINE: YELLOW
POC GLUCOSE, URINE: NEGATIVE MG/DL
POC KETONES, URINE: NEGATIVE MG/DL
POC LEUKOCYTES, URINE: NEGATIVE
POC NITRITE,URINE: NEGATIVE
POC PH, URINE: 6 PH
POC PROTEIN, URINE: NEGATIVE MG/DL
POC SPECIFIC GRAVITY, URINE: 1.01
POC UROBILINOGEN, URINE: 0.2 EU/DL

## 2025-04-24 PROCEDURE — 3074F SYST BP LT 130 MM HG: CPT | Performed by: FAMILY MEDICINE

## 2025-04-24 PROCEDURE — 3078F DIAST BP <80 MM HG: CPT | Performed by: FAMILY MEDICINE

## 2025-04-24 PROCEDURE — 1036F TOBACCO NON-USER: CPT | Performed by: FAMILY MEDICINE

## 2025-04-24 PROCEDURE — 81002 URINALYSIS NONAUTO W/O SCOPE: CPT | Performed by: FAMILY MEDICINE

## 2025-04-24 PROCEDURE — 99214 OFFICE O/P EST MOD 30 MIN: CPT | Performed by: FAMILY MEDICINE

## 2025-04-24 RX ORDER — GABAPENTIN 600 MG/1
600 TABLET ORAL ONCE
OUTPATIENT
Start: 2025-04-24 | End: 2025-04-24

## 2025-04-24 RX ORDER — CELECOXIB 400 MG/1
400 CAPSULE ORAL ONCE
OUTPATIENT
Start: 2025-04-24 | End: 2025-04-24

## 2025-04-24 RX ORDER — ACETAMINOPHEN 325 MG/1
975 TABLET ORAL ONCE
OUTPATIENT
Start: 2025-04-24 | End: 2025-04-24

## 2025-04-24 RX ORDER — CEFAZOLIN SODIUM 2 G/100ML
2 INJECTION, SOLUTION INTRAVENOUS ONCE
OUTPATIENT
Start: 2025-04-24 | End: 2025-04-24

## 2025-04-24 RX ORDER — NABUMETONE 500 MG/1
500 TABLET, FILM COATED ORAL 2 TIMES DAILY
Qty: 60 TABLET | Refills: 0 | Status: SHIPPED | OUTPATIENT
Start: 2025-04-24 | End: 2025-05-24

## 2025-04-24 RX ORDER — CYCLOBENZAPRINE HCL 10 MG
10 TABLET ORAL NIGHTLY PRN
Qty: 30 TABLET | Refills: 0 | Status: SHIPPED | OUTPATIENT
Start: 2025-04-24 | End: 2025-05-24

## 2025-04-24 RX ORDER — SODIUM CHLORIDE, SODIUM LACTATE, POTASSIUM CHLORIDE, CALCIUM CHLORIDE 600; 310; 30; 20 MG/100ML; MG/100ML; MG/100ML; MG/100ML
20 INJECTION, SOLUTION INTRAVENOUS CONTINUOUS
OUTPATIENT
Start: 2025-04-24 | End: 2025-04-25

## 2025-04-24 NOTE — ASSESSMENT & PLAN NOTE
We did a urinalysis to ensure no underlying urinary tract pathology  Large amount of blood noted, will send for microanalysis along with reflex culture  If infection, we need to treat accordingly  If not infection, then may have to consider hematuria workup

## 2025-04-24 NOTE — ASSESSMENT & PLAN NOTE
Risks, benefits, and options of treatment(s) were discussed after reviewing all current medication(s) and drug allergy(ies)  I opted for the treatment that we discussed with instructions on the medication use for your underlying medical ailment(s)    If no improvement, may have to consider PT

## 2025-04-24 NOTE — ASSESSMENT & PLAN NOTE
I suspect this is related to intercostal muscle spasms  Ice to area 10 to 15 minutes 3 times daily  Prescription strength NSAIDs and muscle relaxant medication prescribed  We talked about a differential that can include urinary tract pathology such as kidney stones and UTI along with muscle spasms and intercostal muscle strain for which the medications may provide some benefit

## 2025-04-24 NOTE — PROGRESS NOTES
"Chief Complaint  Patient ID: Babita Oconnor \"Emily\" is a 35 y.o. female who presents for Back Pain.    Past Medical, Surgical, and Family History reviewed and updated in chart.    Reviewed all medications by prescribing practitioner or clinical pharmacist (such as prescriptions, OTCs, herbal therapies and supplements) and documented in the medical record.    History of Present Illness  Right Lower Back Pain     - Emily developed right lower thoracic discomfort about a month ago after moving.      - She does not recall any specific incident that triggered the pain, but she felt pain and soreness the following day.     - There is no radiation of the pain, and she denies any urinary symptoms, fever, or chills.     - Initially, the pain was intense, but she experienced some relief using a heating pad and over-the-counter NSAIDs.     - Currently, the pain has almost dissipated but worsens with certain motions, including twisting of the upper torso to the right.     - She has no history of kidney stones but has chronic and recurrent UTIs. However, this pain does not feel like a UTI to her.    Review of Systems  All pertinent positive symptoms are included in the history of present illness.    All other systems have been reviewed and are negative and noncontributory to this patient's current ailments.    Past Medical History  She has a past medical history of 12 weeks gestation of pregnancy ([facility]) (02/17/2021), 16 weeks gestation of pregnancy ([facility]) (03/19/2021), 20 weeks gestation of pregnancy ([facility]) (04/16/2021), 22 weeks gestation of pregnancy ([facility]) (05/04/2021), 24 weeks gestation of pregnancy ([facility]) (05/17/2021), 28 weeks gestation of pregnancy ([facility]) (06/11/2021), 32 weeks gestation of pregnancy ([facility]) (07/09/2021), 34 weeks gestation of pregnancy ([facility]) (07/21/2021), 36 weeks gestation of pregnancy ([facility]) (08/06/2021), 37 weeks gestation of pregnancy ([facility]) (08/17/2021), 38 weeks " gestation of pregnancy ([facility]) (2021), Encounter for gynecological examination (general) (routine) without abnormal findings (2022), Encounter for immunization (2021), Encounter for insertion of intrauterine contraceptive device (2022), Encounter for routine postpartum follow-up (2021), False labor before 37 completed weeks of gestation, unspecified trimester, Missed  ([facility]) (2020), Other specified health status, Pelvic and perineal pain (2021), Personal history of other complications of pregnancy, childbirth and the puerperium (2021), Personal history of other diseases of the female genital tract (10/06/2020), Personal history of other drug therapy (10/28/2022), Personal history of other specified conditions (2021), Personal history of other specified conditions (2020), Secondary amenorrhea (2021), and Threatened  ([facility]) (2020).    Surgical History  She has a past surgical history that includes Other surgical history (2022) and Other surgical history (2022).     Social History  She reports that she has never smoked. She has never used smokeless tobacco. She reports current alcohol use. She reports that she does not use drugs.    Family History[1]  Medications Prior to Visit[2]  Allergies  Nitrofurantoin monohyd/m-cryst and Sulfamethoxazole-trimethoprim    Immunization History   Administered Date(s) Administered    COVID-19, mRNA, LNP-S, PF, 30 mcg/0.3 mL dose 2021, 2021, 2022    DTP 1990, 1990, 1990, 10/06/1989, 1993    Flu vaccine (IIV4), preservative free *Check age/dose* 09/15/2017, 2018, 10/28/2022    Flu vaccine, quadrivalent, no egg protein, age 6 month or greater (FLUCELVAX) 2022, 10/19/2023    Flu vaccine, trivalent, preservative free, age 6 months and greater (Fluarix/Fluzone/Flulaval) 2013, 10/23/2024    HPV, Quadrivalent 2007,  04/27/2007, 08/28/2007    Hepatitis B vaccine, 19 yrs and under (RECOMBIVAX, ENGERIX) 08/01/2001, 09/07/2001, 05/11/2002    Hib (HbOC) 11/03/1989    Influenza, Unspecified 10/01/2011    Influenza, injectable, quadrivalent 01/31/2020    Influenza, seasonal, injectable 09/11/2015, 09/24/2016    MMR vaccine, subcutaneous (MMR II) 07/05/1989, 06/30/2000    Meningococcal MPSV4 01/11/2006    OPV 1990, 1988, 1988, 04/06/1990, 04/02/1993    PPD Test 06/09/2009, 06/16/2009, 01/11/2011, 12/19/2011    Td vaccine, age 7 years and older (TDVAX) 06/30/2000    Tdap vaccine, age 7 year and older (BOOSTRIX, ADACEL) 03/04/2009, 07/09/2021    Varicella vaccine, subcutaneous (VARIVAX) 04/27/1995     Objective   Visit Vitals  /70 (BP Location: Left arm, Patient Position: Sitting, BP Cuff Size: Adult)   Pulse 92   Wt 83.6 kg (184 lb 6.4 oz)   SpO2 99%   BMI 28.88 kg/m²   OB Status Having periods   Smoking Status Never   BSA 1.99 m²      BP Readings from Last 3 Encounters:   04/24/25 104/70   02/24/25 126/80   12/09/24 104/60      Wt Readings from Last 3 Encounters:   04/24/25 83.6 kg (184 lb 6.4 oz)   02/24/25 81.6 kg (180 lb)   12/09/24 82.1 kg (181 lb)      Relevant Results  Office Visit on 04/24/2025   Component Date Value    POC Color, Urine 04/24/2025 Yellow     POC Appearance, Urine 04/24/2025 Clear     POC Glucose, Urine 04/24/2025 NEGATIVE     POC Bilirubin, Urine 04/24/2025 NEGATIVE     POC Ketones, Urine 04/24/2025 NEGATIVE     POC Specific Gravity, Ur* 04/24/2025 1.010     POC Blood, Urine 04/24/2025 LARGE (3+) (A)     POC PH, Urine 04/24/2025 6.0     POC Protein, Urine 04/24/2025 NEGATIVE     POC Urobilinogen, Urine 04/24/2025 0.2     Poc Nitrite, Urine 04/24/2025 NEGATIVE     POC Leukocytes, Urine 04/24/2025 NEGATIVE      The ASCVD Risk score (Rishi DK, et al., 2019) failed to calculate for the following reasons:    The 2019 ASCVD risk score is only valid for ages 40 to 79    Physical  Exam  CONSTITUTIONAL - well nourished, well developed, looks like stated age, in no acute distress, not ill-appearing, and not tired appearing  SKIN - normal skin color and pigmentation  EYES - normal external exam  LUNGS - breathing comfortably, no dyspnea  EXTREMITIES - no deformities noticeable; right lower thoracic area with reproducible tenderness along the intercostal muscles of T10, 11, 12 without any rib deformities, no rib tenderness; flexion, extension, and rotation to the left reproduce no obvious pain; rotation to the right causes discomfort at the site of concern as noted in the HPI  NEUROLOGICAL - oriented and no focal signs  PSYCHIATRIC - alert, pleasant and cordial, age-appropriate, not anxious or depressed appearing    Assessment and Plan  Problem List Items Addressed This Visit       Acute right-sided thoracic back pain - Primary    I suspect this is related to intercostal muscle spasms  Ice to area 10 to 15 minutes 3 times daily  Prescription strength NSAIDs and muscle relaxant medication prescribed  We talked about a differential that can include urinary tract pathology such as kidney stones and UTI along with muscle spasms and intercostal muscle strain for which the medications may provide some benefit         Relevant Orders    POCT UA (nonautomated) manually resulted (Completed)    Spasm of thoracic back muscle    Risks, benefits, and options of treatment(s) were discussed after reviewing all current medication(s) and drug allergy(ies)  I opted for the treatment that we discussed with instructions on the medication use for your underlying medical ailment(s)    If no improvement, may have to consider PT         Relevant Medications    cyclobenzaprine (Flexeril) 10 mg tablet    nabumetone (Relafen) 500 mg tablet    Asymptomatic microscopic hematuria    We did a urinalysis to ensure no underlying urinary tract pathology  Large amount of blood noted, will send for microanalysis along with reflex  culture  If infection, we need to treat accordingly  If not infection, then may have to consider hematuria workup         Relevant Orders    Urinalysis with Reflex Culture and Microscopic          [1]   Family History  Problem Relation Name Age of Onset    Breast cancer Mother      Atrial fibrillation Mother      Atrial fibrillation Father      Stroke Maternal Grandmother      Macular degeneration Maternal Grandmother     [2]   Outpatient Medications Prior to Visit   Medication Sig Dispense Refill    ALPRAZolam (Xanax) 0.5 mg tablet Take 1/2-1 tablet BID prn 30 tablet 0    atorvastatin (Lipitor) 10 mg tablet Take 1 tablet (10 mg) by mouth once daily. 90 tablet 0    clobetasol (Temovate) 0.05 % cream Apply topically 2 times a day. 30 g 1    fluocinolone 0.01 % cream Apply topically 2 times a day.      fluocinonide (Lidex) 0.05 % external solution       FLUoxetine (PROzac) 20 mg tablet Take 1 tablet (20 mg) by mouth once daily. 90 tablet 1    pantoprazole (ProtoNix) 40 mg EC tablet Take 1 tablet (40 mg) by mouth once daily in the morning. Take before meals. 90 tablet 1     No facility-administered medications prior to visit.

## 2025-04-25 DIAGNOSIS — A49.9 BACTERIAL UTI: Primary | ICD-10-CM

## 2025-04-25 DIAGNOSIS — N39.0 BACTERIAL UTI: Primary | ICD-10-CM

## 2025-04-25 PROBLEM — Z30.9 ENCOUNTER FOR CONTRACEPTIVE MANAGEMENT: Status: ACTIVE | Noted: 2025-04-24

## 2025-04-25 LAB
APPEARANCE UR: CLEAR
BACTERIA #/AREA URNS HPF: ABNORMAL /HPF
BACTERIA UR CULT: ABNORMAL
BILIRUB UR QL STRIP: NEGATIVE
COLOR UR: YELLOW
GLUCOSE UR QL STRIP: NEGATIVE
HGB UR QL STRIP: ABNORMAL
HYALINE CASTS #/AREA URNS LPF: ABNORMAL /LPF
KETONES UR QL STRIP: NEGATIVE
LEUKOCYTE ESTERASE UR QL STRIP: ABNORMAL
NITRITE UR QL STRIP: NEGATIVE
PH UR STRIP: 7 [PH] (ref 5–8)
PROT UR QL STRIP: NEGATIVE
RBC #/AREA URNS HPF: ABNORMAL /HPF
SERVICE CMNT-IMP: ABNORMAL
SP GR UR STRIP: 1.01 (ref 1–1.03)
SQUAMOUS #/AREA URNS HPF: ABNORMAL /HPF
WBC #/AREA URNS HPF: ABNORMAL /HPF

## 2025-04-25 RX ORDER — CIPROFLOXACIN 250 MG/1
250 TABLET, FILM COATED ORAL 2 TIMES DAILY
Qty: 14 TABLET | Refills: 0 | Status: SHIPPED | OUTPATIENT
Start: 2025-04-25 | End: 2025-04-29 | Stop reason: SDUPTHER

## 2025-04-25 NOTE — RESULT ENCOUNTER NOTE
The lab results from your urine analysis indicate the presence of blood, white blood cells, and bacteria. Although they didn't recommend a culture, I believe you have a urinary tract infection that requires treatment.     Given your allergies to sulfa-based medications and Macrobid, I will prescribe Cipro (ciprofloxacin) 250 mg to be taken twice daily for 7 days. I will send this prescription to your pharmacy shortly.    Whether or not that is the cause of the pain that you are talking about, it is debatable because I still think it is muscular.

## 2025-04-29 RX ORDER — CIPROFLOXACIN 250 MG/1
250 TABLET, FILM COATED ORAL 2 TIMES DAILY
Qty: 4 TABLET | Refills: 0 | Status: SHIPPED | OUTPATIENT
Start: 2025-04-29 | End: 2025-05-01

## 2025-05-14 ENCOUNTER — PRE-ADMISSION TESTING (OUTPATIENT)
Dept: PREADMISSION TESTING | Facility: HOSPITAL | Age: 35
End: 2025-05-14
Payer: COMMERCIAL

## 2025-05-14 VITALS
SYSTOLIC BLOOD PRESSURE: 129 MMHG | HEART RATE: 81 BPM | OXYGEN SATURATION: 99 % | DIASTOLIC BLOOD PRESSURE: 93 MMHG | RESPIRATION RATE: 20 BRPM | TEMPERATURE: 97.7 F | HEIGHT: 67 IN | WEIGHT: 186.73 LBS | BODY MASS INDEX: 29.31 KG/M2

## 2025-05-14 DIAGNOSIS — E78.5 HYPERLIPIDEMIA, UNSPECIFIED HYPERLIPIDEMIA TYPE: ICD-10-CM

## 2025-05-14 DIAGNOSIS — F41.9 ANXIETY: ICD-10-CM

## 2025-05-14 DIAGNOSIS — Z30.8 ENCOUNTER FOR OTHER CONTRACEPTIVE MANAGEMENT: ICD-10-CM

## 2025-05-14 DIAGNOSIS — Z01.818 ENCOUNTER FOR PREADMISSION TESTING: Primary | ICD-10-CM

## 2025-05-14 LAB
ANION GAP SERPL CALC-SCNC: 12 MMOL/L (ref 10–20)
BUN SERPL-MCNC: 7 MG/DL (ref 6–23)
CALCIUM SERPL-MCNC: 9.7 MG/DL (ref 8.6–10.3)
CHLORIDE SERPL-SCNC: 101 MMOL/L (ref 98–107)
CO2 SERPL-SCNC: 29 MMOL/L (ref 21–32)
CREAT SERPL-MCNC: 0.82 MG/DL (ref 0.5–1.05)
EGFRCR SERPLBLD CKD-EPI 2021: >90 ML/MIN/1.73M*2
ERYTHROCYTE [DISTWIDTH] IN BLOOD BY AUTOMATED COUNT: 12.5 % (ref 11.5–14.5)
GLUCOSE SERPL-MCNC: 99 MG/DL (ref 74–99)
HCT VFR BLD AUTO: 43 % (ref 36–46)
HGB BLD-MCNC: 14.6 G/DL (ref 12–16)
MCH RBC QN AUTO: 31.1 PG (ref 26–34)
MCHC RBC AUTO-ENTMCNC: 34 G/DL (ref 32–36)
MCV RBC AUTO: 92 FL (ref 80–100)
NRBC BLD-RTO: 0 /100 WBCS (ref 0–0)
PLATELET # BLD AUTO: 348 X10*3/UL (ref 150–450)
POTASSIUM SERPL-SCNC: 3.8 MMOL/L (ref 3.5–5.3)
RBC # BLD AUTO: 4.7 X10*6/UL (ref 4–5.2)
SODIUM SERPL-SCNC: 138 MMOL/L (ref 136–145)
WBC # BLD AUTO: 6 X10*3/UL (ref 4.4–11.3)

## 2025-05-14 PROCEDURE — 99204 OFFICE O/P NEW MOD 45 MIN: CPT | Performed by: NURSE PRACTITIONER

## 2025-05-14 PROCEDURE — 80048 BASIC METABOLIC PNL TOTAL CA: CPT | Performed by: OBSTETRICS & GYNECOLOGY

## 2025-05-14 PROCEDURE — 85027 COMPLETE CBC AUTOMATED: CPT | Performed by: OBSTETRICS & GYNECOLOGY

## 2025-05-14 ASSESSMENT — DUKE ACTIVITY SCORE INDEX (DASI)
CAN YOU DO MODERATE WORK AROUND THE HOUSE LIKE VACUUMING, SWEEPING FLOORS OR CARRYING GROCERIES: YES
CAN YOU DO YARD WORK LIKE RAKING LEAVES, WEEDING OR PUSHING A MOWER: YES
CAN YOU PARTICIPATE IN MODERATE RECREATIONAL ACTIVITIES LIKE GOLF, BOWLING, DANCING, DOUBLES TENNIS OR THROWING A BASEBALL OR FOOTBALL: YES
CAN YOU DO LIGHT WORK AROUND THE HOUSE LIKE DUSTING OR WASHING DISHES: YES
CAN YOU CLIMB A FLIGHT OF STAIRS OR WALK UP A HILL: YES
DASI METS SCORE: 9.9
CAN YOU PARTICIPATE IN STRENOUS SPORTS LIKE SWIMMING, SINGLES TENNIS, FOOTBALL, BASKETBALL, OR SKIING: YES
CAN YOU RUN A SHORT DISTANCE: YES
CAN YOU WALK A BLOCK OR TWO ON LEVEL GROUND: YES
TOTAL_SCORE: 58.2
CAN YOU WALK INDOORS, SUCH AS AROUND YOUR HOUSE: YES
CAN YOU HAVE SEXUAL RELATIONS: YES
CAN YOU TAKE CARE OF YOURSELF (EAT, DRESS, BATHE, OR USE TOILET): YES
CAN YOU DO HEAVY WORK AROUND THE HOUSE LIKE SCRUBBING FLOORS OR LIFTING AND MOVING HEAVY FURNITURE: YES

## 2025-05-14 ASSESSMENT — PAIN SCALES - GENERAL: PAINLEVEL_OUTOF10: 0 - NO PAIN

## 2025-05-14 ASSESSMENT — PAIN - FUNCTIONAL ASSESSMENT: PAIN_FUNCTIONAL_ASSESSMENT: 0-10

## 2025-05-14 NOTE — CPM/PAT H&P
"CPM/PAT Evaluation       Name: Babita Oconnor (Babita Oconnor \"Emily\")  /Age: 1990/35 y.o.     In-Person       Chief Complaint: PAT for planned GYN procedure    35 yr old female w/PHx of anxiety, depression, HLD, hiatal hernia, GERD, psoriasis and encounter for contraceptive management referred to PAT for planned Bilateral laparoscopic salpingectomy w/Dr Ortiz on 2025      Patient reports feeling overall well, denies fever, cough or recent infection. Denies hx of stroke, seizure or blood clot.  Reports remaining otherwise physically active, working out routinely; denies cardiac or respiratory symptoms. Past surgical hx includes wisdom teeth extraction, septoplasty and endoscopy; reports hx of post op nausea.       Followed by PCP (Abdirashid Santos DO) 2025        Medical History[1]    Surgical History[2]    Patient  reports being sexually active and has had partner(s) who are male.    Family History[3]    Allergies[4]    Prior to Admission medications    Medication Sig Start Date End Date Taking? Authorizing Provider   ALPRAZolam (Xanax) 0.5 mg tablet Take 1/2-1 tablet BID prn 25   Abdirashid Santos DO   atorvastatin (Lipitor) 10 mg tablet Take 1 tablet (10 mg) by mouth once daily. 4/3/25 7/2/25  Abdirashid Santos DO   clobetasol (Temovate) 0.05 % cream Apply topically 2 times a day. 24   Britton Ortiz MD   cyclobenzaprine (Flexeril) 10 mg tablet Take 1 tablet (10 mg) by mouth as needed at bedtime for muscle spasms. 25  Abdirashid Santos DO   fluocinolone 0.01 % cream Apply topically 2 times a day.    Historical Provider, MD   fluocinonide (Lidex) 0.05 % external solution  24   Historical Provider, MD   FLUoxetine (PROzac) 20 mg tablet Take 1 tablet (20 mg) by mouth once daily. 4/21/25 10/18/25  German Jackson DO   nabumetone (Relafen) 500 mg tablet Take 1 tablet (500 mg) by mouth 2 times a day. 25  Abdirashid J Tom, DO   pantoprazole (ProtoNix) 40 mg EC " "tablet Take 1 tablet (40 mg) by mouth once daily in the morning. Take before meals. 4/1/25 9/28/25  Abdirashid Santos DO        A ten-point review of systems was completed and is otherwise negative except for what is mentioned in the HPI above.    Physical Exam  Vitals reviewed.   Constitutional:       Appearance: Normal appearance.   HENT:      Head: Normocephalic.      Mouth/Throat:      Mouth: Mucous membranes are moist.   Eyes:      Pupils: Pupils are equal, round, and reactive to light.      Comments: Corrective lenses   Cardiovascular:      Rate and Rhythm: Normal rate and regular rhythm.   Pulmonary:      Effort: Pulmonary effort is normal.      Breath sounds: Normal breath sounds.   Abdominal:      General: Bowel sounds are normal.   Musculoskeletal:         General: Normal range of motion.   Skin:     General: Skin is warm and dry.   Neurological:      Mental Status: She is alert and oriented to person, place, and time.   Psychiatric:         Mood and Affect: Mood normal.          PAT AIRWAY:   Airway:     Mallampati::  I    TM distance::  >3 FB    Neck ROM::  Full  normal        Testing/Diagnostic: BMP, CBC    Patient Specialist/PCP: Abdirashid Santos DO (PCP) 4/24/2025; Ana Maria Soto DO (rheumatology) 2/27/2024    Visit Vitals  BP (!) 129/93   Pulse 81   Temp 36.5 °C (97.7 °F) (Temporal)   Resp 20   Ht 1.702 m (5' 7\")   Wt 84.7 kg (186 lb 11.7 oz)   SpO2 99%   BMI 29.25 kg/m²   OB Status Having periods   Smoking Status Never   BSA 2 m²       DASI Risk Score      Flowsheet Row Pre-Admission Testing from 5/14/2025 in [facility] Questionnaire Series Submission from 4/26/2025 in [facility] OR with Generic Provider Mat   Can you take care of yourself (eat, dress, bathe, or use toilet)?  2.75 filed at 05/14/2025 1242 2.75  filed at 04/26/2025 1836   Can you walk indoors, such as around your house? 1.75 filed at 05/14/2025 1242 1.75  filed at 04/26/2025 1836   Can you walk a block or two on " level ground?  2.75 filed at 05/14/2025 1242 2.75  filed at 04/26/2025 1836   Can you climb a flight of stairs or walk up a hill? 5.5 filed at 05/14/2025 1242 5.5  filed at 04/26/2025 1836   Can you run a short distance? 8 filed at 05/14/2025 1242 8  filed at 04/26/2025 1836   Can you do light work around the house like dusting or washing dishes? 2.7 filed at 05/14/2025 1242 2.7  filed at 04/26/2025 1836   Can you do moderate work around the house like vacuuming, sweeping floors or carrying groceries? 3.5 filed at 05/14/2025 1242 3.5  filed at 04/26/2025 1836   Can you do heavy work around the house like scrubbing floors or lifting and moving heavy furniture?  8 filed at 05/14/2025 1242 8  filed at 04/26/2025 1836   Can you do yard work like raking leaves, weeding or pushing a mower? 4.5 filed at 05/14/2025 1242 4.5  filed at 04/26/2025 1836   Can you have sexual relations? 5.25 filed at 05/14/2025 1242 5.25  filed at 04/26/2025 1836   Can you participate in moderate recreational activities like golf, bowling, dancing, doubles tennis or throwing a baseball or football? 6 filed at 05/14/2025 1242 6  filed at 04/26/2025 1836   Can you participate in strenous sports like swimming, singles tennis, football, basketball, or skiing? 7.5 filed at 05/14/2025 1242 7.5  filed at 04/26/2025 1836   DASI SCORE 58.2 filed at 05/14/2025 1242 58.2  filed at 04/26/2025 1836   METS Score (Will be calculated only when all the questions are answered) 9.9 filed at 05/14/2025 1242 9.9  filed at 04/26/2025 1836          Caprini DVT Assessment    No data to display       Modified Frailty Index    No data to display       [id number] Stroke Risk Points  Current as of just now        N/A 0 to 9 Points:      Last Change: N/A          The [id number] risk score (Lip MILLER, et al. 2009. © 2010 [employer]) quantifies the risk of stroke for a patient with atrial fibrillation. For patients without atrial fibrillation or  under the age of 18 this score appears as N/A. Higher score values generally indicate higher risk of stroke.        This score is not applicable to this patient. Components are not calculated.          Revised Cardiac Risk Index    No data to display       Apfel Simplified Score    No data to display       Risk Analysis Index Results This Encounter    No data found in the last 10 encounters.       Stop Bang Score      Flowsheet Row Pre-Admission Testing from 5/14/2025 in [facility] Questionnaire Series Submission from 4/26/2025 in [facility] OR with Generic Provider Mat   Do you snore loudly? 1 filed at 05/14/2025 1242 0  filed at 04/26/2025 1836   Do you often feel tired or fatigued after your sleep? 1 filed at 05/14/2025 1242 0  filed at 04/26/2025 1836   Has anyone ever observed you stop breathing in your sleep? 0 filed at 05/14/2025 1242 0  filed at 04/26/2025 1836   Do you have or are you being treated for high blood pressure? 0 filed at 05/14/2025 1242 0  filed at 04/26/2025 1836   Recent BMI (Calculated) 29.2 filed at 05/14/2025 1242 28.2  filed at 04/26/2025 1836   Is BMI greater than 35 kg/m2? 0=No filed at 05/14/2025 1242 0=No  filed at 04/26/2025 1836   Age older than 50 years old? 0=No filed at 05/14/2025 1242 0=No  filed at 04/26/2025 1836   Is your neck circumference greater than 17 inches (Male) or 16 inches (Female)? 0 filed at 05/14/2025 1242 --   Gender - Male 0=No filed at 05/14/2025 1242 0=No  filed at 04/26/2025 1836   STOP-BANG Total Score 2 filed at 05/14/2025 1242 --          Prodigy: High Risk  Total Score: 0          ARISCAT Score for Postoperative Pulmonary Complications      Flowsheet Row Pre-Admission Testing from 5/14/2025 in [facility]   Age Calculated Score 0 filed at 05/14/2025 1309   Preoperative SpO2 0 filed at 05/14/2025 1309   Respiratory infection in the last month Either upper or lower (i.e., URI, bronchitis, pneumonia), with fever and  antibiotic treatment 0 filed at 05/14/2025 1309   Preoperative anemia (Hgb less than 10 g/dl) 0 filed at 05/14/2025 1309   Surgical incision  0 filed at 05/14/2025 1309   Duration of surgery  0 filed at 05/14/2025 1309   Emergency Procedure  0 filed at 05/14/2025 1309   ARISCAT Total Score  0 filed at 05/14/2025 1309          Fiorella Perioperative Risk for Myocardial Infarction or Cardiac Arrest (BELLA)      Flowsheet Row Pre-Admission Testing from 5/14/2025 in [facility]   Calculated Age Score 0.74 filed at 05/14/2025 1309   Functional Status  0 filed at 05/14/2025 1309   ASA Class  -3.29 filed at 05/14/2025 1309   Creatinine 0 filed at 05/14/2025 1309   Type of Procedure  0.76 filed at 05/14/2025 1309   BELLA Total Score  -7.04 filed at 05/14/2025 1309   BELLA % 0.09 filed at 05/14/2025 1309            Assessment and Plan:     # Anxiety/depression - continue alprazolam, fluoxetine  # HLD - continue atorvastatin  # Hiatal hernia/GERD - continue pantoprazole  # Psoriasis - scalp only; followed by dermatology  # Encounter for contraceptive management - Bilateral laparoscopic salpingectomy w/Dr Ortiz on 5/22/2025    Medical hx, Allergies, VS and Labs reviewed  Medications addressed w/pre-op instructions provided             [1]   Past Medical History:  Diagnosis Date    12 weeks gestation of pregnancy ([facility]) 02/17/2021    12 weeks gestation of pregnancy    16 weeks gestation of pregnancy ([facility]) 03/19/2021    16 weeks gestation of pregnancy    20 weeks gestation of pregnancy ([facility]) 04/16/2021    20 weeks gestation of pregnancy    22 weeks gestation of pregnancy ([facility]) 05/04/2021    22 weeks gestation of pregnancy    24 weeks gestation of pregnancy ([facility]) 05/17/2021    24 weeks gestation of pregnancy    28 weeks gestation of pregnancy ([facility]) 06/11/2021    28 weeks gestation of pregnancy    32 weeks gestation of pregnancy ([facility]) 07/09/2021    32 weeks gestation of pregnancy    34 weeks  gestation of pregnancy ([facility]) 2021    34 weeks gestation of pregnancy    36 weeks gestation of pregnancy ([facility]) 2021    36 weeks gestation of pregnancy    37 weeks gestation of pregnancy ([facility]) 2021    37 weeks gestation of pregnancy    38 weeks gestation of pregnancy ([facility]) 2021    38 weeks gestation of pregnancy    Anxiety     Depression     Encounter for gynecological examination (general) (routine) without abnormal findings 2022    Well woman exam with routine gynecological exam    Encounter for immunization 2021    Encounter for immunization    Encounter for insertion of intrauterine contraceptive device 2022    Encounter for insertion of progestin-releasing intrauterine contraceptive device (IUD)    Encounter for routine postpartum follow-up 2021    Postpartum exam    False labor before 37 completed weeks of gestation, unspecified trimester     Threatened premature labor, antepartum    GERD (gastroesophageal reflux disease)     Hyperlipidemia     Missed  ([facility]) 2020    Missed     Other specified health status     Known health problems: none    Pelvic and perineal pain 2021    Pelvic pain in female    Personal history of other complications of pregnancy, childbirth and the puerperium 2021    History of hyperemesis gravidarum    Personal history of other diseases of the female genital tract 10/06/2020    History of dysfunctional uterine bleeding    Personal history of other drug therapy 10/28/2022    History of influenza vaccination    Personal history of other specified conditions 2021    History of decreased fetal movements    Personal history of other specified conditions 2020    History of dysuria    Secondary amenorrhea 2021    Secondary amenorrhea    Threatened  ([facility]) 2020    , threatened, antepartum    Vision loss    [2]   Past Surgical History:  Procedure Laterality  Date    OTHER SURGICAL HISTORY  03/31/2022    Nasal septoplasty    OTHER SURGICAL HISTORY  03/31/2022    [place] tooth extraction    SEPTOPLASTY      UPPER GASTROINTESTINAL ENDOSCOPY     [3]   Family History  Problem Relation Name Age of Onset    Breast cancer Mother      Atrial fibrillation Mother      Atrial fibrillation Father      Stroke Maternal Grandmother      Macular degeneration Maternal Grandmother     [4]   Allergies  Allergen Reactions    Nitrofurantoin Monohyd/M-Cryst Itching and Other     Itching palms and feet    Sulfamethoxazole-Trimethoprim Rash

## 2025-05-14 NOTE — H&P (VIEW-ONLY)
"CPM/PAT Evaluation       Name: Babita Oconnor (Babita Oconnor \"Emily\")  /Age: 1990/35 y.o.     In-Person       Chief Complaint: PAT for planned GYN procedure    35 yr old female w/PHx of anxiety, depression, HLD, hiatal hernia, GERD, psoriasis and encounter for contraceptive management referred to PAT for planned Bilateral laparoscopic salpingectomy w/Dr Ortiz on 2025      Patient reports feeling overall well, denies fever, cough or recent infection. Denies hx of stroke, seizure or blood clot.  Reports remaining otherwise physically active, working out routinely; denies cardiac or respiratory symptoms. Past surgical hx includes wisdom teeth extraction, septoplasty and endoscopy; reports hx of post op nausea.       Followed by PCP (Abdirashid Santos DO) 2025        Medical History[1]    Surgical History[2]    Patient  reports being sexually active and has had partner(s) who are male.    Family History[3]    Allergies[4]    Prior to Admission medications    Medication Sig Start Date End Date Taking? Authorizing Provider   ALPRAZolam (Xanax) 0.5 mg tablet Take 1/2-1 tablet BID prn 25   Abdirashid Santos DO   atorvastatin (Lipitor) 10 mg tablet Take 1 tablet (10 mg) by mouth once daily. 4/3/25 7/2/25  Abdirashid Santos DO   clobetasol (Temovate) 0.05 % cream Apply topically 2 times a day. 24   Britton Ortiz MD   cyclobenzaprine (Flexeril) 10 mg tablet Take 1 tablet (10 mg) by mouth as needed at bedtime for muscle spasms. 25  Abdirashid Santos DO   fluocinolone 0.01 % cream Apply topically 2 times a day.    Historical Provider, MD   fluocinonide (Lidex) 0.05 % external solution  24   Historical Provider, MD   FLUoxetine (PROzac) 20 mg tablet Take 1 tablet (20 mg) by mouth once daily. 4/21/25 10/18/25  German Jackson DO   nabumetone (Relafen) 500 mg tablet Take 1 tablet (500 mg) by mouth 2 times a day. 25  Abdirashid J Tom, DO   pantoprazole (ProtoNix) 40 mg EC " "tablet Take 1 tablet (40 mg) by mouth once daily in the morning. Take before meals. 4/1/25 9/28/25  Abdirashid Santos DO        A ten-point review of systems was completed and is otherwise negative except for what is mentioned in the HPI above.    Physical Exam  Vitals reviewed.   Constitutional:       Appearance: Normal appearance.   HENT:      Head: Normocephalic.      Mouth/Throat:      Mouth: Mucous membranes are moist.   Eyes:      Pupils: Pupils are equal, round, and reactive to light.      Comments: Corrective lenses   Cardiovascular:      Rate and Rhythm: Normal rate and regular rhythm.   Pulmonary:      Effort: Pulmonary effort is normal.      Breath sounds: Normal breath sounds.   Abdominal:      General: Bowel sounds are normal.   Musculoskeletal:         General: Normal range of motion.   Skin:     General: Skin is warm and dry.   Neurological:      Mental Status: She is alert and oriented to person, place, and time.   Psychiatric:         Mood and Affect: Mood normal.          PAT AIRWAY:   Airway:     Mallampati::  I    TM distance::  >3 FB    Neck ROM::  Full  normal        Testing/Diagnostic: BMP, CBC    Patient Specialist/PCP: Abdirashid Santos DO (PCP) 4/24/2025; Ana Maria Soto DO (rheumatology) 2/27/2024    Visit Vitals  BP (!) 129/93   Pulse 81   Temp 36.5 °C (97.7 °F) (Temporal)   Resp 20   Ht 1.702 m (5' 7\")   Wt 84.7 kg (186 lb 11.7 oz)   SpO2 99%   BMI 29.25 kg/m²   OB Status Having periods   Smoking Status Never   BSA 2 m²       DASI Risk Score      Flowsheet Row Pre-Admission Testing from 5/14/2025 in [facility] Questionnaire Series Submission from 4/26/2025 in [facility] OR with Generic Provider Mat   Can you take care of yourself (eat, dress, bathe, or use toilet)?  2.75 filed at 05/14/2025 1242 2.75  filed at 04/26/2025 1836   Can you walk indoors, such as around your house? 1.75 filed at 05/14/2025 1242 1.75  filed at 04/26/2025 1836   Can you walk a block or two on " level ground?  2.75 filed at 05/14/2025 1242 2.75  filed at 04/26/2025 1836   Can you climb a flight of stairs or walk up a hill? 5.5 filed at 05/14/2025 1242 5.5  filed at 04/26/2025 1836   Can you run a short distance? 8 filed at 05/14/2025 1242 8  filed at 04/26/2025 1836   Can you do light work around the house like dusting or washing dishes? 2.7 filed at 05/14/2025 1242 2.7  filed at 04/26/2025 1836   Can you do moderate work around the house like vacuuming, sweeping floors or carrying groceries? 3.5 filed at 05/14/2025 1242 3.5  filed at 04/26/2025 1836   Can you do heavy work around the house like scrubbing floors or lifting and moving heavy furniture?  8 filed at 05/14/2025 1242 8  filed at 04/26/2025 1836   Can you do yard work like raking leaves, weeding or pushing a mower? 4.5 filed at 05/14/2025 1242 4.5  filed at 04/26/2025 1836   Can you have sexual relations? 5.25 filed at 05/14/2025 1242 5.25  filed at 04/26/2025 1836   Can you participate in moderate recreational activities like golf, bowling, dancing, doubles tennis or throwing a baseball or football? 6 filed at 05/14/2025 1242 6  filed at 04/26/2025 1836   Can you participate in strenous sports like swimming, singles tennis, football, basketball, or skiing? 7.5 filed at 05/14/2025 1242 7.5  filed at 04/26/2025 1836   DASI SCORE 58.2 filed at 05/14/2025 1242 58.2  filed at 04/26/2025 1836   METS Score (Will be calculated only when all the questions are answered) 9.9 filed at 05/14/2025 1242 9.9  filed at 04/26/2025 1836          Caprini DVT Assessment    No data to display       Modified Frailty Index    No data to display       [id number] Stroke Risk Points  Current as of just now        N/A 0 to 9 Points:      Last Change: N/A          The [id number] risk score (Lip MILLER, et al. 2009. © 2010 [employer]) quantifies the risk of stroke for a patient with atrial fibrillation. For patients without atrial fibrillation or  under the age of 18 this score appears as N/A. Higher score values generally indicate higher risk of stroke.        This score is not applicable to this patient. Components are not calculated.          Revised Cardiac Risk Index    No data to display       Apfel Simplified Score    No data to display       Risk Analysis Index Results This Encounter    No data found in the last 10 encounters.       Stop Bang Score      Flowsheet Row Pre-Admission Testing from 5/14/2025 in [facility] Questionnaire Series Submission from 4/26/2025 in [facility] OR with Generic Provider Mat   Do you snore loudly? 1 filed at 05/14/2025 1242 0  filed at 04/26/2025 1836   Do you often feel tired or fatigued after your sleep? 1 filed at 05/14/2025 1242 0  filed at 04/26/2025 1836   Has anyone ever observed you stop breathing in your sleep? 0 filed at 05/14/2025 1242 0  filed at 04/26/2025 1836   Do you have or are you being treated for high blood pressure? 0 filed at 05/14/2025 1242 0  filed at 04/26/2025 1836   Recent BMI (Calculated) 29.2 filed at 05/14/2025 1242 28.2  filed at 04/26/2025 1836   Is BMI greater than 35 kg/m2? 0=No filed at 05/14/2025 1242 0=No  filed at 04/26/2025 1836   Age older than 50 years old? 0=No filed at 05/14/2025 1242 0=No  filed at 04/26/2025 1836   Is your neck circumference greater than 17 inches (Male) or 16 inches (Female)? 0 filed at 05/14/2025 1242 --   Gender - Male 0=No filed at 05/14/2025 1242 0=No  filed at 04/26/2025 1836   STOP-BANG Total Score 2 filed at 05/14/2025 1242 --          Prodigy: High Risk  Total Score: 0          ARISCAT Score for Postoperative Pulmonary Complications      Flowsheet Row Pre-Admission Testing from 5/14/2025 in [facility]   Age Calculated Score 0 filed at 05/14/2025 1309   Preoperative SpO2 0 filed at 05/14/2025 1309   Respiratory infection in the last month Either upper or lower (i.e., URI, bronchitis, pneumonia), with fever and  antibiotic treatment 0 filed at 05/14/2025 1309   Preoperative anemia (Hgb less than 10 g/dl) 0 filed at 05/14/2025 1309   Surgical incision  0 filed at 05/14/2025 1309   Duration of surgery  0 filed at 05/14/2025 1309   Emergency Procedure  0 filed at 05/14/2025 1309   ARISCAT Total Score  0 filed at 05/14/2025 1309          Fiorella Perioperative Risk for Myocardial Infarction or Cardiac Arrest (BELLA)      Flowsheet Row Pre-Admission Testing from 5/14/2025 in [facility]   Calculated Age Score 0.74 filed at 05/14/2025 1309   Functional Status  0 filed at 05/14/2025 1309   ASA Class  -3.29 filed at 05/14/2025 1309   Creatinine 0 filed at 05/14/2025 1309   Type of Procedure  0.76 filed at 05/14/2025 1309   BELLA Total Score  -7.04 filed at 05/14/2025 1309   BELLA % 0.09 filed at 05/14/2025 1309            Assessment and Plan:     # Anxiety/depression - continue alprazolam, fluoxetine  # HLD - continue atorvastatin  # Hiatal hernia/GERD - continue pantoprazole  # Psoriasis - scalp only; followed by dermatology  # Encounter for contraceptive management - Bilateral laparoscopic salpingectomy w/Dr Ortiz on 5/22/2025    Medical hx, Allergies, VS and Labs reviewed  Medications addressed w/pre-op instructions provided             [1]   Past Medical History:  Diagnosis Date    12 weeks gestation of pregnancy ([facility]) 02/17/2021    12 weeks gestation of pregnancy    16 weeks gestation of pregnancy ([facility]) 03/19/2021    16 weeks gestation of pregnancy    20 weeks gestation of pregnancy ([facility]) 04/16/2021    20 weeks gestation of pregnancy    22 weeks gestation of pregnancy ([facility]) 05/04/2021    22 weeks gestation of pregnancy    24 weeks gestation of pregnancy ([facility]) 05/17/2021    24 weeks gestation of pregnancy    28 weeks gestation of pregnancy ([facility]) 06/11/2021    28 weeks gestation of pregnancy    32 weeks gestation of pregnancy ([facility]) 07/09/2021    32 weeks gestation of pregnancy    34 weeks  gestation of pregnancy ([facility]) 2021    34 weeks gestation of pregnancy    36 weeks gestation of pregnancy ([facility]) 2021    36 weeks gestation of pregnancy    37 weeks gestation of pregnancy ([facility]) 2021    37 weeks gestation of pregnancy    38 weeks gestation of pregnancy ([facility]) 2021    38 weeks gestation of pregnancy    Anxiety     Depression     Encounter for gynecological examination (general) (routine) without abnormal findings 2022    Well woman exam with routine gynecological exam    Encounter for immunization 2021    Encounter for immunization    Encounter for insertion of intrauterine contraceptive device 2022    Encounter for insertion of progestin-releasing intrauterine contraceptive device (IUD)    Encounter for routine postpartum follow-up 2021    Postpartum exam    False labor before 37 completed weeks of gestation, unspecified trimester     Threatened premature labor, antepartum    GERD (gastroesophageal reflux disease)     Hyperlipidemia     Missed  ([facility]) 2020    Missed     Other specified health status     Known health problems: none    Pelvic and perineal pain 2021    Pelvic pain in female    Personal history of other complications of pregnancy, childbirth and the puerperium 2021    History of hyperemesis gravidarum    Personal history of other diseases of the female genital tract 10/06/2020    History of dysfunctional uterine bleeding    Personal history of other drug therapy 10/28/2022    History of influenza vaccination    Personal history of other specified conditions 2021    History of decreased fetal movements    Personal history of other specified conditions 2020    History of dysuria    Secondary amenorrhea 2021    Secondary amenorrhea    Threatened  ([facility]) 2020    , threatened, antepartum    Vision loss    [2]   Past Surgical History:  Procedure Laterality  Date    OTHER SURGICAL HISTORY  03/31/2022    Nasal septoplasty    OTHER SURGICAL HISTORY  03/31/2022    [place] tooth extraction    SEPTOPLASTY      UPPER GASTROINTESTINAL ENDOSCOPY     [3]   Family History  Problem Relation Name Age of Onset    Breast cancer Mother      Atrial fibrillation Mother      Atrial fibrillation Father      Stroke Maternal Grandmother      Macular degeneration Maternal Grandmother     [4]   Allergies  Allergen Reactions    Nitrofurantoin Monohyd/M-Cryst Itching and Other     Itching palms and feet    Sulfamethoxazole-Trimethoprim Rash

## 2025-05-14 NOTE — PREPROCEDURE INSTRUCTIONS
Medication List            Accurate as of May 14, 2025 12:56 PM. Always use your most recent med list.                ALPRAZolam 0.5 mg tablet  Commonly known as: Xanax  Take 1/2-1 tablet BID prn  Medication Adjustments for Surgery: Take/Use as prescribed     atorvastatin 10 mg tablet  Commonly known as: Lipitor  Take 1 tablet (10 mg) by mouth once daily.  Medication Adjustments for Surgery: Take/Use as prescribed     clobetasol 0.05 % cream  Commonly known as: Temovate  Apply topically 2 times a day.  Medication Adjustments for Surgery: Do Not take on the morning of surgery     cyclobenzaprine 10 mg tablet  Commonly known as: Flexeril  Take 1 tablet (10 mg) by mouth as needed at bedtime for muscle spasms.  Medication Adjustments for Surgery: Do Not take on the morning of surgery     fluocinolone 0.01 % cream  Medication Adjustments for Surgery: Do Not take on the morning of surgery     fluocinonide 0.05 % external solution  Commonly known as: Lidex  Medication Adjustments for Surgery: Do Not take on the morning of surgery     FLUoxetine 20 mg tablet  Commonly known as: PROzac  Take 1 tablet (20 mg) by mouth once daily.  Medication Adjustments for Surgery: Take/Use as prescribed     nabumetone 500 mg tablet  Commonly known as: Relafen  Take 1 tablet (500 mg) by mouth 2 times a day.  Additional Medication Adjustments for Surgery: Take last dose 7 days before surgery     pantoprazole 40 mg EC tablet  Commonly known as: ProtoNix  Take 1 tablet (40 mg) by mouth once daily in the morning. Take before meals.  Medication Adjustments for Surgery: Take/Use as prescribed                              NPO Instructions:    Do not eat any food after midnight the night before your surgery/procedure.    Additional Instructions:     Day of Surgery:  Wear  comfortable loose fitting clothing  Do not use moisturizers, creams, lotions or perfume  All jewelry and valuables should be left at home                PRE-OPERATIVE  INSTRUCTIONS FOR SURGERY    *Do not eat anything after midnight the night of surgery.  This includes food of any kind (including hard candy, cough drops, mints).   You may have up to 13 ounces of clear liquid  until TWO hours prior to your scheduled arrival time.  Clear liquids include water, black tea/coffee, (no milk or cream) apple juice and electrolyte drinks (GATORADE).  You may chew gum until TWO hours prior you your surgery/procedure.         *One of our staff members will call you ONE business day before your surgery, between 11 am-2 pm to let you know the time to arrive.  If you have not received a call by 2 pm, call [phone]    *When you arrive at the hospital-->GO TO Registration on the ground floor  *Stop smoking 24 hours prior to surgery.  No Marijuana, CBD Oil or Vaping for 48 hours  *No alcohol 24 hours prior to surgery  *You will need a responsible adult to drive you home  -No acrylic nails or nail polish on at least one fingernail, NO polish on toes for foot surgery  -You may be asked to remove your dentures, partial plate, eyeglasses or contact lenses before going to surgery.  Please bring a case for these items.  -Body piercings need to be removed.  Jewelry and valuables should be left at home.  -Put on loose,  comfortable, clean clothing, that will accommodate bandages        What is a home antibacterial shower?  This shower is a way of cleaning the skin with a germ killing solution before surgery.  The solution contains chlorhexidine, commonly known as CHG.  CHG is a skin cleanser with germ killing ability.  Let your doctor know if you are allergic to chlorhexidine.    Why do I need to take a preoperative antibacterial shower?  Skin is not sterile.  It is best to try to make your skin as free of germs as possible before surgery.  Proper cleansing with a germ killing soap before surgery can lower the number of germs on your skin.  This helps to reduce the risk of infection at the surgical  site.  Following the instructions listed below will help you prepare your skin for surgery.      How do I use the solution?    Steps: Begin using your CHG soap 5 days before your surgery on __________________.    *First, wash and rinse your hair using the CHG soap.  Keep CHG soap away from ear canals and eyes.   Rinse completely, do not condition.  Hair extensions should be removed.    *Wash your face with your normal soap and rinse.   *Apply the CHG solution to a clean wet washcloth.  Turn the water off or move away from the water spray to avoid premature rinsing of the CHG soap as you are applying.  Firmly lather your entire body from the neck down.  Do not use on your face.    *Pay special attention to the area(s) where your incision(s) will be located unless they are on your face.  Avoid scrubbing your skin too hard.  The important part is to have the CHG soap sit on your skin for 3 minutes.   *When the 3 minutes are up, turn on the water and rinse the CHG solution off your body completely.  *Do not wash with regular soap after you  have  used the CHG soap solution.  *Pat  yourself dry with a clean, freshly laundered towel.  *Do not apply powders, deodorants or lotions.  *Dress in clean freshly laundered night clothes.    *Be sure to sleep with clean freshly laundered sheets.    *Be aware the CHG will cause stains on fabrics; if you wash them with bleach after use.  Rinse your washcloth and other linens that have contact with CHG completely.  Use only non-chlorine detergents to launder the items  used.  *The morning of surgery is the fifth day.  Repeat the above steps and dress in clean comfortable clothing.     What is oral/dental rinse?  It is mouthwash.  It is a way of cleaning the he mouth with a germ-killing solution before your surgery.  The solution contains chlorhexidine, commonly known as CHG.  It is used inside the mouth to kill a bacteria known as Staphylococcus aureus.  Let your doctor know if you  are allergic to Chlorhexidine.    Why do I need to use CHG oral/ dental rinse?  The CHG oral/dental rinse helps to kill bacteria in your mouth know as Staphylococcus aureus.  This reduces the risk of infection at the surgical site.    Using your CHG oral/dental rinse    STEPS:    Use your CHG oral/dental rinse after you brush your teeth the night before (at bedtime) and the morning of your surgery.  Follow all the directions on your prescription label.  *Use the cap on the container to measure 15 ml  *Swish (gargle if you can) the mouthwash in your mouth for at least 30 seconds, (do not swallow) and spit out  *After you use your CHG rinse, do not rinse your mouth with water, drink or eat.  Please refer to the prescription label for the appropriate time to resume oral intake.    What side effects might I have using the CHG oral/dental rinse?  CHG rinse will stick you plaque on the teeth.  Brush and floss just before use.   Teeth brushing will help avoid staining of the plaque during  use.  Teeth brushing will help avoid staining of plaque during  use.      *If you have any further questions about your pre-op instructions,  not mentioned in this handout, then call [phone]*    What you may be asked to bring to surgery:  ___Crutches, walker  ___CPAP machine  ___Urine specimen

## 2025-05-16 ENCOUNTER — APPOINTMENT (OUTPATIENT)
Dept: OBSTETRICS AND GYNECOLOGY | Facility: CLINIC | Age: 35
End: 2025-05-16
Payer: COMMERCIAL

## 2025-05-19 ENCOUNTER — APPOINTMENT (OUTPATIENT)
Dept: OBSTETRICS AND GYNECOLOGY | Facility: CLINIC | Age: 35
End: 2025-05-19
Payer: COMMERCIAL

## 2025-05-19 VITALS
WEIGHT: 187 LBS | SYSTOLIC BLOOD PRESSURE: 110 MMHG | DIASTOLIC BLOOD PRESSURE: 68 MMHG | BODY MASS INDEX: 28.34 KG/M2 | HEIGHT: 68 IN

## 2025-05-19 DIAGNOSIS — Z71.9 ENCOUNTER FOR CONSULTATION: ICD-10-CM

## 2025-05-19 DIAGNOSIS — G89.18 POST-OP PAIN: ICD-10-CM

## 2025-05-19 DIAGNOSIS — G89.18 POST-OP PAIN: Primary | ICD-10-CM

## 2025-05-19 PROBLEM — O36.8190 DECREASED FETAL MOVEMENT DURING PREGNANCY (HHS-HCC): Status: RESOLVED | Noted: 2024-02-01 | Resolved: 2025-05-19

## 2025-05-19 PROCEDURE — 99213 OFFICE O/P EST LOW 20 MIN: CPT | Performed by: OBSTETRICS & GYNECOLOGY

## 2025-05-19 PROCEDURE — 1036F TOBACCO NON-USER: CPT | Performed by: OBSTETRICS & GYNECOLOGY

## 2025-05-19 PROCEDURE — 3008F BODY MASS INDEX DOCD: CPT | Performed by: OBSTETRICS & GYNECOLOGY

## 2025-05-19 PROCEDURE — 3074F SYST BP LT 130 MM HG: CPT | Performed by: OBSTETRICS & GYNECOLOGY

## 2025-05-19 PROCEDURE — 3078F DIAST BP <80 MM HG: CPT | Performed by: OBSTETRICS & GYNECOLOGY

## 2025-05-19 RX ORDER — IBUPROFEN 600 MG/1
600 TABLET, FILM COATED ORAL EVERY 6 HOURS PRN
Qty: 30 TABLET | Refills: 0 | Status: SHIPPED | OUTPATIENT
Start: 2025-05-19 | End: 2025-05-19 | Stop reason: SDUPTHER

## 2025-05-19 RX ORDER — IBUPROFEN 600 MG/1
600 TABLET, FILM COATED ORAL EVERY 6 HOURS PRN
Qty: 30 TABLET | Refills: 0 | Status: SHIPPED | OUTPATIENT
Start: 2025-05-19 | End: 2025-05-29

## 2025-05-19 RX ORDER — OXYCODONE HYDROCHLORIDE 5 MG/1
5 TABLET ORAL ONCE
Qty: 8 TABLET | Refills: 0 | Status: SHIPPED | OUTPATIENT
Start: 2025-05-19 | End: 2025-05-19 | Stop reason: SDUPTHER

## 2025-05-19 RX ORDER — OXYCODONE HYDROCHLORIDE 5 MG/1
5 TABLET ORAL EVERY 6 HOURS PRN
Qty: 8 TABLET | Refills: 0 | Status: SHIPPED | OUTPATIENT
Start: 2025-05-19

## 2025-05-19 ASSESSMENT — PAIN SCALES - GENERAL: PAINLEVEL_OUTOF10: 0-NO PAIN

## 2025-05-19 NOTE — PROGRESS NOTES
35-year-old G3, P1 who desires permanent sterility.  Has laparoscopic bilateral salpingectomy planned for 5/22/2025.  Today, re- discussed procedure, benefits, inherent surgical risks including, bleeding, infection, anesthetic risk, risk of damage to surrounding structures, thromboembolism, future pregnancy.  All questions answered.  Written consent obtained.  Discussed postoperative course, pain management including Tylenol, ibuprofen, oxycodone.  Prescription for ibuprofen and oxycodone sent.  All questions answered.

## 2025-05-22 ENCOUNTER — HOSPITAL ENCOUNTER (OUTPATIENT)
Facility: HOSPITAL | Age: 35
Setting detail: OUTPATIENT SURGERY
Discharge: HOME | End: 2025-05-22
Attending: OBSTETRICS & GYNECOLOGY | Admitting: OBSTETRICS & GYNECOLOGY
Payer: COMMERCIAL

## 2025-05-22 ENCOUNTER — ANESTHESIA (OUTPATIENT)
Dept: OPERATING ROOM | Facility: HOSPITAL | Age: 35
End: 2025-05-22
Payer: COMMERCIAL

## 2025-05-22 ENCOUNTER — ANESTHESIA EVENT (OUTPATIENT)
Dept: OPERATING ROOM | Facility: HOSPITAL | Age: 35
End: 2025-05-22
Payer: COMMERCIAL

## 2025-05-22 ENCOUNTER — PHARMACY VISIT (OUTPATIENT)
Dept: PHARMACY | Facility: CLINIC | Age: 35
End: 2025-05-22
Payer: COMMERCIAL

## 2025-05-22 VITALS
TEMPERATURE: 97.2 F | BODY MASS INDEX: 28.34 KG/M2 | WEIGHT: 187 LBS | DIASTOLIC BLOOD PRESSURE: 60 MMHG | RESPIRATION RATE: 18 BRPM | HEIGHT: 68 IN | SYSTOLIC BLOOD PRESSURE: 98 MMHG | HEART RATE: 62 BPM | OXYGEN SATURATION: 100 %

## 2025-05-22 DIAGNOSIS — G89.18 POST-OP PAIN: ICD-10-CM

## 2025-05-22 DIAGNOSIS — Z30.9 ENCOUNTER FOR CONTRACEPTIVE MANAGEMENT, UNSPECIFIED TYPE: Primary | ICD-10-CM

## 2025-05-22 LAB — PREGNANCY TEST URINE, POC: NEGATIVE

## 2025-05-22 PROCEDURE — 0751T DGTZ GLS MCRSCP SLD LEVEL II: CPT | Mod: TC,PARLAB | Performed by: OBSTETRICS & GYNECOLOGY

## 2025-05-22 PROCEDURE — 7100000009 HC PHASE TWO TIME - INITIAL BASE CHARGE: Performed by: OBSTETRICS & GYNECOLOGY

## 2025-05-22 PROCEDURE — 3600000004 HC OR TIME - INITIAL BASE CHARGE - PROCEDURE LEVEL FOUR: Performed by: OBSTETRICS & GYNECOLOGY

## 2025-05-22 PROCEDURE — 58661 LAPAROSCOPY REMOVE ADNEXA: CPT | Performed by: OBSTETRICS & GYNECOLOGY

## 2025-05-22 PROCEDURE — 2500000004 HC RX 250 GENERAL PHARMACY W/ HCPCS (ALT 636 FOR OP/ED)

## 2025-05-22 PROCEDURE — 2500000004 HC RX 250 GENERAL PHARMACY W/ HCPCS (ALT 636 FOR OP/ED): Performed by: OBSTETRICS & GYNECOLOGY

## 2025-05-22 PROCEDURE — 2500000005 HC RX 250 GENERAL PHARMACY W/O HCPCS: Mod: JZ | Performed by: OBSTETRICS & GYNECOLOGY

## 2025-05-22 PROCEDURE — 3600000009 HC OR TIME - EACH INCREMENTAL 1 MINUTE - PROCEDURE LEVEL FOUR: Performed by: OBSTETRICS & GYNECOLOGY

## 2025-05-22 PROCEDURE — 88302 TISSUE EXAM BY PATHOLOGIST: CPT | Performed by: PATHOLOGY

## 2025-05-22 PROCEDURE — 7100000001 HC RECOVERY ROOM TIME - INITIAL BASE CHARGE: Performed by: OBSTETRICS & GYNECOLOGY

## 2025-05-22 PROCEDURE — 2500000001 HC RX 250 WO HCPCS SELF ADMINISTERED DRUGS (ALT 637 FOR MEDICARE OP): Performed by: OBSTETRICS & GYNECOLOGY

## 2025-05-22 PROCEDURE — RXMED WILLOW AMBULATORY MEDICATION CHARGE

## 2025-05-22 PROCEDURE — 7100000002 HC RECOVERY ROOM TIME - EACH INCREMENTAL 1 MINUTE: Performed by: OBSTETRICS & GYNECOLOGY

## 2025-05-22 PROCEDURE — 81025 URINE PREGNANCY TEST: CPT | Performed by: OBSTETRICS & GYNECOLOGY

## 2025-05-22 PROCEDURE — 2720000007 HC OR 272 NO HCPCS: Performed by: OBSTETRICS & GYNECOLOGY

## 2025-05-22 PROCEDURE — 2500000004 HC RX 250 GENERAL PHARMACY W/ HCPCS (ALT 636 FOR OP/ED): Mod: JZ | Performed by: OBSTETRICS & GYNECOLOGY

## 2025-05-22 PROCEDURE — 7100000010 HC PHASE TWO TIME - EACH INCREMENTAL 1 MINUTE: Performed by: OBSTETRICS & GYNECOLOGY

## 2025-05-22 PROCEDURE — 2500000002 HC RX 250 W HCPCS SELF ADMINISTERED DRUGS (ALT 637 FOR MEDICARE OP, ALT 636 FOR OP/ED): Performed by: ANESTHESIOLOGY

## 2025-05-22 PROCEDURE — 2500000004 HC RX 250 GENERAL PHARMACY W/ HCPCS (ALT 636 FOR OP/ED): Performed by: ANESTHESIOLOGY

## 2025-05-22 PROCEDURE — 3700000001 HC GENERAL ANESTHESIA TIME - INITIAL BASE CHARGE: Performed by: OBSTETRICS & GYNECOLOGY

## 2025-05-22 PROCEDURE — 2500000004 HC RX 250 GENERAL PHARMACY W/ HCPCS (ALT 636 FOR OP/ED): Mod: JZ | Performed by: NURSE ANESTHETIST, CERTIFIED REGISTERED

## 2025-05-22 PROCEDURE — 3700000002 HC GENERAL ANESTHESIA TIME - EACH INCREMENTAL 1 MINUTE: Performed by: OBSTETRICS & GYNECOLOGY

## 2025-05-22 RX ORDER — GABAPENTIN 300 MG/1
600 CAPSULE ORAL ONCE
Status: COMPLETED | OUTPATIENT
Start: 2025-05-22 | End: 2025-05-22

## 2025-05-22 RX ORDER — METOCLOPRAMIDE HYDROCHLORIDE 5 MG/ML
10 INJECTION INTRAMUSCULAR; INTRAVENOUS ONCE AS NEEDED
Status: DISCONTINUED | OUTPATIENT
Start: 2025-05-22 | End: 2025-05-22 | Stop reason: HOSPADM

## 2025-05-22 RX ORDER — PROPOFOL 10 MG/ML
INJECTION, EMULSION INTRAVENOUS AS NEEDED
Status: DISCONTINUED | OUTPATIENT
Start: 2025-05-22 | End: 2025-05-22

## 2025-05-22 RX ORDER — FENTANYL CITRATE 50 UG/ML
INJECTION, SOLUTION INTRAMUSCULAR; INTRAVENOUS AS NEEDED
Status: DISCONTINUED | OUTPATIENT
Start: 2025-05-22 | End: 2025-05-22

## 2025-05-22 RX ORDER — HYDROMORPHONE HYDROCHLORIDE 1 MG/ML
1 INJECTION, SOLUTION INTRAMUSCULAR; INTRAVENOUS; SUBCUTANEOUS EVERY 5 MIN PRN
Status: DISCONTINUED | OUTPATIENT
Start: 2025-05-22 | End: 2025-05-22 | Stop reason: HOSPADM

## 2025-05-22 RX ORDER — BUPIVACAINE HYDROCHLORIDE 5 MG/ML
INJECTION, SOLUTION PERINEURAL AS NEEDED
Status: DISCONTINUED | OUTPATIENT
Start: 2025-05-22 | End: 2025-05-22 | Stop reason: HOSPADM

## 2025-05-22 RX ORDER — PROCHLORPERAZINE EDISYLATE 5 MG/ML
INJECTION INTRAMUSCULAR; INTRAVENOUS
Status: COMPLETED
Start: 2025-05-22 | End: 2025-05-22

## 2025-05-22 RX ORDER — ALBUTEROL SULFATE 0.83 MG/ML
2.5 SOLUTION RESPIRATORY (INHALATION) ONCE AS NEEDED
Status: DISCONTINUED | OUTPATIENT
Start: 2025-05-22 | End: 2025-05-22 | Stop reason: HOSPADM

## 2025-05-22 RX ORDER — APREPITANT 40 MG/1
40 CAPSULE ORAL ONCE
Status: COMPLETED | OUTPATIENT
Start: 2025-05-22 | End: 2025-05-22

## 2025-05-22 RX ORDER — CELECOXIB 100 MG/1
400 CAPSULE ORAL ONCE
Status: COMPLETED | OUTPATIENT
Start: 2025-05-22 | End: 2025-05-22

## 2025-05-22 RX ORDER — MORPHINE SULFATE 2 MG/ML
2 INJECTION, SOLUTION INTRAMUSCULAR; INTRAVENOUS EVERY 5 MIN PRN
Status: DISCONTINUED | OUTPATIENT
Start: 2025-05-22 | End: 2025-05-22 | Stop reason: HOSPADM

## 2025-05-22 RX ORDER — SCOPOLAMINE 1 MG/3D
1 PATCH, EXTENDED RELEASE TRANSDERMAL
Status: DISCONTINUED | OUTPATIENT
Start: 2025-05-22 | End: 2025-05-22 | Stop reason: HOSPADM

## 2025-05-22 RX ORDER — METOPROLOL TARTRATE 1 MG/ML
5 INJECTION, SOLUTION INTRAVENOUS ONCE
Status: DISCONTINUED | OUTPATIENT
Start: 2025-05-22 | End: 2025-05-22 | Stop reason: HOSPADM

## 2025-05-22 RX ORDER — MIDAZOLAM HYDROCHLORIDE 1 MG/ML
INJECTION, SOLUTION INTRAMUSCULAR; INTRAVENOUS AS NEEDED
Status: DISCONTINUED | OUTPATIENT
Start: 2025-05-22 | End: 2025-05-22

## 2025-05-22 RX ORDER — SODIUM CHLORIDE 0.9 G/100ML
INJECTION, SOLUTION IRRIGATION AS NEEDED
Status: DISCONTINUED | OUTPATIENT
Start: 2025-05-22 | End: 2025-05-22 | Stop reason: HOSPADM

## 2025-05-22 RX ORDER — SCOPOLAMINE 1 MG/3D
1 PATCH, EXTENDED RELEASE TRANSDERMAL ONCE
Status: DISCONTINUED | OUTPATIENT
Start: 2025-05-22 | End: 2025-05-22 | Stop reason: HOSPADM

## 2025-05-22 RX ORDER — PROCHLORPERAZINE EDISYLATE 5 MG/ML
5 INJECTION INTRAMUSCULAR; INTRAVENOUS ONCE
Status: CANCELLED | OUTPATIENT
Start: 2025-05-22 | End: 2025-05-22

## 2025-05-22 RX ORDER — ROCURONIUM BROMIDE 10 MG/ML
INJECTION, SOLUTION INTRAVENOUS AS NEEDED
Status: DISCONTINUED | OUTPATIENT
Start: 2025-05-22 | End: 2025-05-22

## 2025-05-22 RX ORDER — ONDANSETRON HYDROCHLORIDE 2 MG/ML
4 INJECTION, SOLUTION INTRAVENOUS ONCE AS NEEDED
Status: COMPLETED | OUTPATIENT
Start: 2025-05-22 | End: 2025-05-22

## 2025-05-22 RX ORDER — ACETAMINOPHEN 325 MG/1
975 TABLET ORAL ONCE
Status: DISCONTINUED | OUTPATIENT
Start: 2025-05-22 | End: 2025-05-22 | Stop reason: HOSPADM

## 2025-05-22 RX ORDER — FAMOTIDINE 10 MG/ML
20 INJECTION, SOLUTION INTRAVENOUS ONCE
Status: COMPLETED | OUTPATIENT
Start: 2025-05-22 | End: 2025-05-22

## 2025-05-22 RX ORDER — SODIUM CHLORIDE, SODIUM LACTATE, POTASSIUM CHLORIDE, CALCIUM CHLORIDE 600; 310; 30; 20 MG/100ML; MG/100ML; MG/100ML; MG/100ML
20 INJECTION, SOLUTION INTRAVENOUS CONTINUOUS
Status: DISCONTINUED | OUTPATIENT
Start: 2025-05-22 | End: 2025-05-22 | Stop reason: HOSPADM

## 2025-05-22 RX ORDER — ACETAMINOPHEN 325 MG/1
975 TABLET ORAL ONCE
Status: COMPLETED | OUTPATIENT
Start: 2025-05-22 | End: 2025-05-22

## 2025-05-22 RX ORDER — MEPERIDINE HYDROCHLORIDE 50 MG/ML
12.5 INJECTION INTRAMUSCULAR; INTRAVENOUS; SUBCUTANEOUS EVERY 10 MIN PRN
Status: DISCONTINUED | OUTPATIENT
Start: 2025-05-22 | End: 2025-05-22 | Stop reason: HOSPADM

## 2025-05-22 RX ORDER — HYDROMORPHONE HYDROCHLORIDE 1 MG/ML
INJECTION, SOLUTION INTRAMUSCULAR; INTRAVENOUS; SUBCUTANEOUS AS NEEDED
Status: DISCONTINUED | OUTPATIENT
Start: 2025-05-22 | End: 2025-05-22

## 2025-05-22 RX ORDER — SODIUM CHLORIDE, SODIUM LACTATE, POTASSIUM CHLORIDE, CALCIUM CHLORIDE 600; 310; 30; 20 MG/100ML; MG/100ML; MG/100ML; MG/100ML
100 INJECTION, SOLUTION INTRAVENOUS CONTINUOUS
Status: DISCONTINUED | OUTPATIENT
Start: 2025-05-22 | End: 2025-05-22 | Stop reason: HOSPADM

## 2025-05-22 RX ORDER — ONDANSETRON HYDROCHLORIDE 2 MG/ML
INJECTION, SOLUTION INTRAVENOUS AS NEEDED
Status: DISCONTINUED | OUTPATIENT
Start: 2025-05-22 | End: 2025-05-22

## 2025-05-22 RX ORDER — DEXMEDETOMIDINE HYDROCHLORIDE 100 UG/ML
INJECTION, SOLUTION INTRAVENOUS AS NEEDED
Status: DISCONTINUED | OUTPATIENT
Start: 2025-05-22 | End: 2025-05-22

## 2025-05-22 RX ORDER — HYDRALAZINE HYDROCHLORIDE 20 MG/ML
10 INJECTION INTRAMUSCULAR; INTRAVENOUS EVERY 30 MIN PRN
Status: DISCONTINUED | OUTPATIENT
Start: 2025-05-22 | End: 2025-05-22 | Stop reason: HOSPADM

## 2025-05-22 RX ORDER — MIDAZOLAM HYDROCHLORIDE 1 MG/ML
1 INJECTION, SOLUTION INTRAMUSCULAR; INTRAVENOUS ONCE AS NEEDED
Status: DISCONTINUED | OUTPATIENT
Start: 2025-05-22 | End: 2025-05-22 | Stop reason: HOSPADM

## 2025-05-22 RX ORDER — LIDOCAINE HCL/PF 100 MG/5ML
SYRINGE (ML) INTRAVENOUS AS NEEDED
Status: DISCONTINUED | OUTPATIENT
Start: 2025-05-22 | End: 2025-05-22

## 2025-05-22 RX ORDER — CEFAZOLIN SODIUM 2 G/50ML
2 SOLUTION INTRAVENOUS ONCE
Status: COMPLETED | OUTPATIENT
Start: 2025-05-22 | End: 2025-05-22

## 2025-05-22 RX ORDER — DOCUSATE SODIUM 100 MG/1
100 CAPSULE, LIQUID FILLED ORAL 2 TIMES DAILY
Qty: 20 CAPSULE | Refills: 0 | Status: SHIPPED | OUTPATIENT
Start: 2025-05-22

## 2025-05-22 RX ORDER — DIPHENHYDRAMINE HYDROCHLORIDE 50 MG/ML
25 INJECTION, SOLUTION INTRAMUSCULAR; INTRAVENOUS ONCE AS NEEDED
Status: DISCONTINUED | OUTPATIENT
Start: 2025-05-22 | End: 2025-05-22 | Stop reason: HOSPADM

## 2025-05-22 RX ORDER — LABETALOL HYDROCHLORIDE 5 MG/ML
10 INJECTION, SOLUTION INTRAVENOUS ONCE AS NEEDED
Status: DISCONTINUED | OUTPATIENT
Start: 2025-05-22 | End: 2025-05-22 | Stop reason: HOSPADM

## 2025-05-22 RX ADMIN — ONDANSETRON 4 MG: 2 INJECTION INTRAMUSCULAR; INTRAVENOUS at 12:38

## 2025-05-22 RX ADMIN — DEXAMETHASONE SODIUM PHOSPHATE 8 MG: 4 INJECTION, SOLUTION INTRAMUSCULAR; INTRAVENOUS at 12:11

## 2025-05-22 RX ADMIN — MIDAZOLAM 2 MG: 1 INJECTION INTRAMUSCULAR; INTRAVENOUS at 11:58

## 2025-05-22 RX ADMIN — HYDROMORPHONE HYDROCHLORIDE 1 MG: 1 INJECTION, SOLUTION INTRAMUSCULAR; INTRAVENOUS; SUBCUTANEOUS at 12:55

## 2025-05-22 RX ADMIN — GABAPENTIN 600 MG: 300 CAPSULE ORAL at 11:38

## 2025-05-22 RX ADMIN — SUGAMMADEX 200 MG: 100 INJECTION, SOLUTION INTRAVENOUS at 12:51

## 2025-05-22 RX ADMIN — DEXMEDETOMIDINE HYDROCHLORIDE 8 MCG: 100 INJECTION, SOLUTION, CONCENTRATE INTRAVENOUS at 12:36

## 2025-05-22 RX ADMIN — ROCURONIUM BROMIDE 50 MG: 10 INJECTION INTRAVENOUS at 12:02

## 2025-05-22 RX ADMIN — FAMOTIDINE 20 MG: 10 INJECTION, SOLUTION INTRAVENOUS at 14:44

## 2025-05-22 RX ADMIN — DEXMEDETOMIDINE HYDROCHLORIDE 12 MCG: 100 INJECTION, SOLUTION, CONCENTRATE INTRAVENOUS at 12:14

## 2025-05-22 RX ADMIN — SCOPOLAMINE 1 PATCH: 1.5 PATCH, EXTENDED RELEASE TRANSDERMAL at 11:38

## 2025-05-22 RX ADMIN — SODIUM CHLORIDE, POTASSIUM CHLORIDE, SODIUM LACTATE AND CALCIUM CHLORIDE: 600; 310; 30; 20 INJECTION, SOLUTION INTRAVENOUS at 11:53

## 2025-05-22 RX ADMIN — LIDOCAINE HYDROCHLORIDE 80 MG: 20 INJECTION INTRAVENOUS at 12:02

## 2025-05-22 RX ADMIN — ACETAMINOPHEN 975 MG: 325 TABLET ORAL at 11:38

## 2025-05-22 RX ADMIN — CELECOXIB 400 MG: 100 CAPSULE ORAL at 11:38

## 2025-05-22 RX ADMIN — DEXMEDETOMIDINE HYDROCHLORIDE 8 MCG: 100 INJECTION, SOLUTION, CONCENTRATE INTRAVENOUS at 12:47

## 2025-05-22 RX ADMIN — DEXMEDETOMIDINE HYDROCHLORIDE 12 MCG: 100 INJECTION, SOLUTION, CONCENTRATE INTRAVENOUS at 12:02

## 2025-05-22 RX ADMIN — CEFAZOLIN SODIUM 2 G: 2 SOLUTION INTRAVENOUS at 12:08

## 2025-05-22 RX ADMIN — FENTANYL CITRATE 100 MCG: 50 INJECTION, SOLUTION INTRAMUSCULAR; INTRAVENOUS at 12:02

## 2025-05-22 RX ADMIN — ONDANSETRON 4 MG: 2 INJECTION INTRAMUSCULAR; INTRAVENOUS at 14:11

## 2025-05-22 RX ADMIN — APREPITANT 40 MG: 40 CAPSULE ORAL at 11:38

## 2025-05-22 RX ADMIN — PROPOFOL 150 MG: 10 INJECTION, EMULSION INTRAVENOUS at 12:02

## 2025-05-22 RX ADMIN — PROPOFOL 50 MG: 10 INJECTION, EMULSION INTRAVENOUS at 12:36

## 2025-05-22 RX ADMIN — PROCHLORPERAZINE EDISYLATE 10 MG: 5 INJECTION INTRAMUSCULAR; INTRAVENOUS at 14:55

## 2025-05-22 SDOH — HEALTH STABILITY: MENTAL HEALTH: CURRENT SMOKER: 0

## 2025-05-22 ASSESSMENT — PAIN SCALES - GENERAL

## 2025-05-22 ASSESSMENT — COLUMBIA-SUICIDE SEVERITY RATING SCALE - C-SSRS
1. IN THE PAST MONTH, HAVE YOU WISHED YOU WERE DEAD OR WISHED YOU COULD GO TO SLEEP AND NOT WAKE UP?: NO
2. HAVE YOU ACTUALLY HAD ANY THOUGHTS OF KILLING YOURSELF?: NO
6. HAVE YOU EVER DONE ANYTHING, STARTED TO DO ANYTHING, OR PREPARED TO DO ANYTHING TO END YOUR LIFE?: NO

## 2025-05-22 ASSESSMENT — PAIN - FUNCTIONAL ASSESSMENT: PAIN_FUNCTIONAL_ASSESSMENT: 0-10

## 2025-05-22 NOTE — OP NOTE
"BILATERAL LAPAROSCOPIC SALPINGECTOMY Operative Note     Date: 2025  OR Location: [facility]    Name: Babita Oconnor \"David", : 1990, Age: 35 y.o., MRN: [id number], Sex: female    Diagnosis  Pre-op Diagnosis      * Encounter for contraceptive management, unspecified type [Z30.9] Post-op Diagnosis     * Encounter for contraceptive management, unspecified type [Z30.9]     Procedures  BILATERAL LAPAROSCOPIC SALPINGECTOMY  68563 - MA LAPAROSCOPY W/RMVL ADNEXAL STRUCTURES      Surgeons      * Britton Ortiz - Primary    Resident/Fellow/Other Assistant:  Surgeons and Role:  * No surgeons found with a matching role *    Staff:   Circulator: Latanya Kennedyub Person: Antoine  Surgical Assistant: Bea  Surgical Assistant: Antoine Spence Scrub: Jacy  Circulator: Sirisha    Anesthesia Staff: Anesthesiologist: Lawrence Barrios MD  CRNA: ANDREW Zuniga-GAVIN, HASEEB    Procedure Summary  Anesthesia: General  ASA: II  Estimated Blood Loss: 0mL  Intra-op Medications: * Intraprocedure medication information is unavailable because the case start and end events have not been set *           Anesthesia Record               Intraprocedure I/O Totals          Intake    lactated Ringer's infusion 800.00 mL    Total Intake 800 mL          Specimen:   ID Type Source Tests Collected by Time   1 : bilateral fallopian tubes Tissue SOFT TISSUE RESECTION SURGICAL PATHOLOGY EXAM Britton Ortiz MD 2025 1229                 Drains and/or Catheters: * None in log *    Tourniquet Times:         Implants:     Findings: normal anatomy    Indications: Babita Oconnor \"David" is an 35 y.o. female who is having surgery for Encounter for contraceptive management, unspecified type [Z30.9].     The patient was seen in the preoperative area. The risks, benefits, complications, treatment options, non-operative alternatives, expected recovery and outcomes were discussed with the patient. The possibilities of reaction to medication, " pulmonary aspiration, injury to surrounding structures, bleeding, recurrent infection, the need for additional procedures, failure to diagnose a condition, and creating a complication requiring transfusion or operation were discussed with the patient. The patient concurred with the proposed plan, giving informed consent.  The site of surgery was properly noted/marked if necessary per policy. The patient has been actively warmed in preoperative area. Preoperative antibiotics have been ordered and given within 1 hours of incision. Venous thrombosis prophylaxis have been ordered including bilateral sequential compression devices    Procedure Details: The patient was taken to the OR where after patient led timeout identified the correct patient, procedure and perioperative concerns, she underwent her general anesthetic.  Exam under anesthesia revealed a 10-week size anteverted uterus and no pelvic mass.  The patient was placed in the dorsolithotomy position using low Viral stirrups.  Arms were tucked.  Cervix vagina perineum abdomen prepped draped usual fashion.    Cervix was dilated to allow passage of a uterine manipulator.  Wolfe catheter was inserted and was draining clear yellow urine.  Gloves were changed and attention was turned to the abdomen.  A 1 cm infraumbilical incision was made vertically on the lower aspect of the umbilicus down to the level of the fascia which was incised vertically with Estrada scissors.  Posterior fascial sheath was identified, elevated and incised vertically.  Peritoneum was identified and elevated and entered bluntly.  The 12 mm Fleming port was passed through this and the balloon inflated.  Abdomen was insufflated and the following was observed laparoscopically.  Two 5 mm lateral ports were then placed at the level of the umbilicus, under  direct laparoscopic visualization.    Uterus was 10 weeks size.  Anatomy including ovaries were normal.  Bilaterally the following was performed.   The tube was identified by following it to its fimbriated end.  It was elevated and using the 5 mm LigaSure we came across the mesosalpinx.  The tube was amputated at the cornua of the uterus.  Each tube was brought out through the Fleming port and sent together to pathology.      All laparoscopic instruments were removed.  Marcaine injected in all 3 incision sites the fascia at the level of the umbilical incision was closed with 0 Vicryl.  Subcuticular stitches of 4-0 Vicryl used to close the 3 skin incisions.  Uterine manipulator and Wolfe catheter were removed and cervix inspected and found to be hemostatic.   Evidence of Infection:   Complications:  None; patient tolerated the procedure well.    Disposition: PACU - hemodynamically stable.  Condition: stable         Task Performed by RNFA or Surgical Assistant:  Diane assist          Additional Details: none    Attending Attestation:     Britton Ortiz  Phone Number: [phone]

## 2025-05-22 NOTE — ANESTHESIA PREPROCEDURE EVALUATION
"Patient: Babita Oconnor \"Emily\"    Procedure Information       Date/Time: 05/22/25 1200    Procedure: BILATERAL LAPAROSCOPIC SALPINGECTOMY - laparoscopic bilateral salpingectomy  NO AUTH REQUIRED    Location: [facility] 04 / [facility]    Surgeons: Britton Ortiz MD            Relevant Problems   Anesthesia   (-) Difficult intubation   (-) PONV (postoperative nausea and vomiting)      Cardiac   (+) Hypertension in obstetric context (HHS-HCC)      Neuro   (+) Anxiety   (+) Mild postpartum depression   (+) Ulnar neuropathy of both upper extremities      GI   (+) GERD without esophagitis      GYN   (+) Dysfunctional uterine bleeding       Clinical information reviewed:   Tobacco  Allergies  Meds   Med Hx  Surg Hx  OB Status  Fam Hx  Soc   Hx        NPO Detail:  NPO/Void Status  Date of Last Liquid: 05/22/25  Time of Last Liquid: 0900 (water)  Date of Last Solid: 05/21/25  Time of Last Solid: 2000         Physical Exam    Airway  Mallampati: I  TM distance: >3 FB  Neck ROM: full     Cardiovascular - normal exam  Rhythm: regular  Rate: normal     Dental    Pulmonary - normal exam   Abdominal            Anesthesia Plan    History of general anesthesia?: yes  History of complications of general anesthesia?: no    ASA 2     general     The patient is not a current smoker.  Education provided regarding risk of obstructive sleep apnea.  intravenous induction   Postoperative pain plan includes opioids.  Trial extubation is planned.  Anesthetic plan and risks discussed with patient.    Plan discussed with CAA, attending and CRNA.      "

## 2025-05-22 NOTE — ANESTHESIA POSTPROCEDURE EVALUATION
"Patient: Babita Oconnor \"Emily\"    Procedure Summary       Date: 05/22/25 Room / Location: [facility] / [facility]    Anesthesia Start: 1153 Anesthesia Stop: 1306    Procedure: BILATERAL LAPAROSCOPIC SALPINGECTOMY (Abdomen) Diagnosis:       Encounter for contraceptive management, unspecified type      (Encounter for contraceptive management, unspecified type [Z30.9])    Surgeons: Britton Ortiz MD Responsible Provider: Lawrence Barrios MD    Anesthesia Type: general ASA Status: 2            Anesthesia Type: general    Vitals Value Taken Time   BP 90/54 05/22/25 13:30   Temp 36.2 °C (97.2 °F) 05/22/25 13:04   Pulse 64 05/22/25 13:33   Resp 12 05/22/25 13:04   SpO2 100 % 05/22/25 13:33   Vitals shown include unfiled device data.    Anesthesia Post Evaluation    Patient location during evaluation: PACU  Patient participation: complete - patient participated  Level of consciousness: awake and alert  Pain management: satisfactory to patient  Airway patency: patent  Cardiovascular status: acceptable  Respiratory status: acceptable  Hydration status: acceptable  Postoperative Nausea and Vomiting: none        No notable events documented.    "

## 2025-05-22 NOTE — ANESTHESIA PROCEDURE NOTES
Airway  Date/Time: 5/22/2025 12:05 PM  Reason: elective      Staffing  Performed: CRNA   Authorized by: Lawrence Barrios MD    Performed by: ANDREW Zuniga-CRNA, HASEEB  Patient location during procedure: OR    Patient Condition  Indications for airway management: anesthesia  Sedation level: deep     Final Airway Details   Preoxygenated: yes  Final airway type: endotracheal airway  Successful airway: ETT  Cuffed: yes   Successful intubation technique: direct laryngoscopy  Adjuncts used in placement: intubating stylet  Blade: Preciado  Blade size: #2  ETT size (mm): 7.5  Cormack-Lehane Classification: grade I - full view of glottis  Placement verified by: chest auscultation and capnometry   Measured from: lips  ETT to lips (cm): 21  Number of attempts at approach: 1

## 2025-05-23 ASSESSMENT — PAIN SCALES - GENERAL: PAINLEVEL_OUTOF10: 0 - NO PAIN

## 2025-06-05 PROBLEM — O21.0 HYPEREMESIS GRAVIDARUM (HHS-HCC): Status: RESOLVED | Noted: 2024-02-01 | Resolved: 2025-06-05

## 2025-06-05 PROBLEM — O16.9: Status: RESOLVED | Noted: 2024-02-01 | Resolved: 2025-06-05

## 2025-06-05 LAB
LABORATORY COMMENT REPORT: NORMAL
PATH REPORT.FINAL DX SPEC: NORMAL
PATH REPORT.GROSS SPEC: NORMAL
PATH REPORT.RELEVANT HX SPEC: NORMAL
PATH REPORT.TOTAL CANCER: NORMAL

## 2025-06-09 ENCOUNTER — OFFICE VISIT (OUTPATIENT)
Dept: OBSTETRICS AND GYNECOLOGY | Facility: CLINIC | Age: 35
End: 2025-06-09
Payer: COMMERCIAL

## 2025-06-09 VITALS
HEIGHT: 68 IN | BODY MASS INDEX: 28.34 KG/M2 | DIASTOLIC BLOOD PRESSURE: 70 MMHG | SYSTOLIC BLOOD PRESSURE: 118 MMHG | WEIGHT: 187 LBS

## 2025-06-09 DIAGNOSIS — Z90.79 STATUS POST BILATERAL SALPINGECTOMY: ICD-10-CM

## 2025-06-09 PROCEDURE — 99213 OFFICE O/P EST LOW 20 MIN: CPT | Performed by: OBSTETRICS & GYNECOLOGY

## 2025-06-09 PROCEDURE — 3008F BODY MASS INDEX DOCD: CPT | Performed by: OBSTETRICS & GYNECOLOGY

## 2025-06-09 PROCEDURE — 1036F TOBACCO NON-USER: CPT | Performed by: OBSTETRICS & GYNECOLOGY

## 2025-06-09 ASSESSMENT — PATIENT HEALTH QUESTIONNAIRE - PHQ9
1. LITTLE INTEREST OR PLEASURE IN DOING THINGS: NOT AT ALL
2. FEELING DOWN, DEPRESSED OR HOPELESS: NOT AT ALL
SUM OF ALL RESPONSES TO PHQ9 QUESTIONS 1 AND 2: 0

## 2025-06-09 ASSESSMENT — PAIN SCALES - GENERAL: PAINLEVEL_OUTOF10: 0-NO PAIN

## 2025-06-09 NOTE — PROGRESS NOTES
"Chief Complaint   Patient presents with    Post-op     3 Week Post Op     DEBRA Laparoscopic Salpingectomy 25  [id number]  /70   Ht 1.727 m (5' 8\")   Wt 84.8 kg (187 lb)   LMP 2025   BMI 28.43 kg/m²   OB Status Having periods   Smoking Status Never   BSA 2.02 m²      C/O incision in belly button is still a little painful and irritated.    Chaperone declined.       2 weeks postop from bilateral laparoscopic salpingectomy.  Pathology benign.  Overall patient feels well.  Still some soreness and umbilical incision but getting better week to week.    REVIEW OF SYSTEMS    Please see HPI for reported pertinent positives, which would supersede this ROS    Constitutional:  Denies fever, chills, wt gain or loss, fatigue    Genito-Urinary:  Denies genital lesion or sores, vaginal dryness, itching  or pain.  No abnormal vaginal discharge or unexplained vaginal bleeding.  No dysuria, urinary incontinence or frequency.  Denies pelvic pain, dysmenorrhea or dyspareunia.    Eyes:  Denies vision changes, dryness  ENT:  No hearing loss, sinus pain or congestion, nosebleeds  Cardiovascular:  No chest pain or palpitations  Respiratory:  No SOB, cough, wheezing  GI:  No Nausea, vomiting, diarrhea, constipation, abdominal pain  Musculoskeletal:  No new back pain. joint pain, peripheral edema  Skin:  No rash or skin lesion  Neurologic:  No HA, numbness or dizziness  Psychiatric:  No new anxiety or depression  Endocrine:  No loss of hair or hirsutism  Hematologic/lymphatic:  No swollen lymph nodes.  No reported tendency for easy bruising or bleeding    Patient admits to no other systemic complaints      Vitals:    25 1057   BP: 118/70       PHYSICAL EXAM      PSYCH:  Pt is alert, oriented and cooperative    SKIN: warm, dry, w/o lesion    HEAD AND FACE:  External inspection of eyes, ears, functional cranial nerves normal and intact    THYROID:  No thyromegaly    CARDIOVASCULAR:  Warm and well " Perfused    PULMONARY:  No respiratory distress    ABDOMEN:  soft, nontender.  No mass or organomegaly.    3 incisions clean, dry intact, healing well.      Assessment/Plan    Diagnoses and all orders for this visit:  Status post bilateral salpingectomy

## 2025-06-30 DIAGNOSIS — Z13.0 SCREENING FOR BLOOD DISEASE: ICD-10-CM

## 2025-06-30 DIAGNOSIS — E78.00 ELEVATED LDL CHOLESTEROL LEVEL: Primary | ICD-10-CM

## 2025-06-30 DIAGNOSIS — Z13.29 SCREENING FOR THYROID DISORDER: ICD-10-CM

## 2025-06-30 DIAGNOSIS — Z00.00 ANNUAL PHYSICAL EXAM: ICD-10-CM

## 2025-06-30 DIAGNOSIS — Z13.1 SCREENING FOR DIABETES MELLITUS: ICD-10-CM

## 2025-06-30 DIAGNOSIS — Z13.6 SCREENING FOR CARDIOVASCULAR CONDITION: ICD-10-CM

## 2025-07-08 ENCOUNTER — APPOINTMENT (OUTPATIENT)
Dept: PRIMARY CARE | Facility: CLINIC | Age: 35
End: 2025-07-08
Payer: COMMERCIAL

## 2025-07-09 ENCOUNTER — OFFICE VISIT (OUTPATIENT)
Dept: PRIMARY CARE | Facility: CLINIC | Age: 35
End: 2025-07-09
Payer: COMMERCIAL

## 2025-07-09 VITALS
SYSTOLIC BLOOD PRESSURE: 126 MMHG | BODY MASS INDEX: 28.19 KG/M2 | HEIGHT: 68 IN | HEART RATE: 68 BPM | WEIGHT: 186 LBS | DIASTOLIC BLOOD PRESSURE: 80 MMHG

## 2025-07-09 DIAGNOSIS — J30.2 SEASONAL ALLERGIC RHINITIS, UNSPECIFIED TRIGGER: ICD-10-CM

## 2025-07-09 DIAGNOSIS — L40.50 PSORIATIC ARTHRITIS (MULTI): ICD-10-CM

## 2025-07-09 DIAGNOSIS — F41.9 ANXIETY: ICD-10-CM

## 2025-07-09 DIAGNOSIS — E78.00 ELEVATED LDL CHOLESTEROL LEVEL: Primary | ICD-10-CM

## 2025-07-09 PROCEDURE — 1036F TOBACCO NON-USER: CPT | Performed by: FAMILY MEDICINE

## 2025-07-09 PROCEDURE — 3008F BODY MASS INDEX DOCD: CPT | Performed by: FAMILY MEDICINE

## 2025-07-09 PROCEDURE — 99214 OFFICE O/P EST MOD 30 MIN: CPT | Performed by: FAMILY MEDICINE

## 2025-07-09 RX ORDER — AZELASTINE 1 MG/ML
1 SPRAY, METERED NASAL 2 TIMES DAILY
Qty: 30 ML | Refills: 5 | Status: SHIPPED | OUTPATIENT
Start: 2025-07-09

## 2025-07-09 RX ORDER — FLUOXETINE 20 MG/1
20 TABLET ORAL DAILY
Qty: 90 TABLET | Refills: 1 | Status: SHIPPED | OUTPATIENT
Start: 2025-07-09 | End: 2026-01-05

## 2025-07-09 NOTE — ASSESSMENT & PLAN NOTE
Once I review your blood work that was done this morning, I can adjust your medication accordingly

## 2025-07-09 NOTE — ASSESSMENT & PLAN NOTE
When you start to develop nasal congestion, use the Astelin nasal spray that was sent to the pharmacy in hopes that this may be a bit more acutely active for you as opposed to using Flonase and then this should help reduce the chances of a sinus infection occurring

## 2025-07-09 NOTE — PROGRESS NOTES
"Chief Complaint  Patient ID: Babita Oconnor \"David" is a 35 y.o. female who presents for Hyperlipidemia.    Past Medical, Surgical, and Family History reviewed and updated in chart.    Reviewed all medications by prescribing practitioner or clinical pharmacist (such as prescriptions, OTCs, herbal therapies and supplements) and documented in the medical record.    History of Present Illness  1. Hyperlipidemia     - Currently taking Lipitor 10 mg daily and tolerating it well.     - Had fasting blood work this morning; results are pending.     - No symptoms of acute coronary syndrome (ACS).    2. Anxiety     - No longer taking Xanax; has a few tablets remaining from past use during her divorce.     - Currently on Prozac 20 mg once daily, with good tolerance.     - Denies any suicidal or homicidal ideation.    3. Nasal Congestion     - Experiences sinus infections twice a year; recently treated with Augmentin.     - Reports gastrointestinal discomfort from antibiotics despite taking a probiotic.     - Inquiring about preventive measures for sinus infections.     - Occasionally uses Flonase, but it does not seem to prevent infections.    Review of Systems  All pertinent positive symptoms are included in the history of present illness.    All other systems have been reviewed and are negative and noncontributory to this patient's current ailments.    Social History  She reports that she has never smoked. She has never used smokeless tobacco. She reports current alcohol use. She reports that she does not use drugs.    Allergies  Nitrofurantoin monohyd/m-cryst and Sulfamethoxazole-trimethoprim    Objective   Visit Vitals  /80   Pulse 68   Ht 1.727 m (5' 8\")   Wt 84.4 kg (186 lb)   LMP 06/01/2025   BMI 28.28 kg/m²   OB Status Having periods   Smoking Status Never   BSA 2.01 m²        BP Readings from Last 3 Encounters:   07/09/25 126/80   06/09/25 118/70   05/22/25 98/60      Wt Readings from Last 3 Encounters: "   07/09/25 84.4 kg (186 lb)   06/09/25 84.8 kg (187 lb)   05/22/25 84.8 kg (187 lb)      Physical Exam  CONSTITUTIONAL - well nourished, well developed, looks like stated age, in no acute distress, not ill-appearing, and not tired appearing  SKIN - normal skin color and pigmentation, normal skin turgor without rash, lesions, or nodules visualized, tattoos on the upper extremities  HEAD - no trauma, normocephalic  EYES - pupils are equal and reactive to light, extraocular muscles are intact, and normal external exam  CHEST - clear to auscultation, no wheezing, no crackles and no rales, good effort  CARDIAC - regular rate and regular rhythm, no skipped beats, no murmur  EXTREMITIES - no obvious or evident edema, no obvious or evident deformities  NEUROLOGICAL - normal gait, normal balance, normal motor, no ataxia, alert, oriented and no focal signs  PSYCHIATRIC - alert, pleasant and cordial, age-appropriate    Assessment and Plan  Problem List Items Addressed This Visit       Elevated LDL cholesterol level - Primary    Once I review your blood work that was done this morning, I can adjust your medication accordingly         Psoriatic arthritis (Multi)    Stable on current medication regimen         Anxiety    Stable, no changes to medication recommended         Relevant Medications    FLUoxetine (PROzac) 20 mg tablet    Seasonal allergic rhinitis    When you start to develop nasal congestion, use the Astelin nasal spray that was sent to the pharmacy in hopes that this may be a bit more acutely active for you as opposed to using Flonase and then this should help reduce the chances of a sinus infection occurring         Relevant Medications    azelastine (Astelin) 137 mcg (0.1 %) nasal spray

## 2025-07-10 DIAGNOSIS — E78.00 ELEVATED LDL CHOLESTEROL LEVEL: ICD-10-CM

## 2025-07-10 LAB
ALBUMIN SERPL-MCNC: 4.1 G/DL (ref 3.6–5.1)
ALP SERPL-CCNC: 108 U/L (ref 31–125)
ALT SERPL-CCNC: 18 U/L (ref 6–29)
ANION GAP SERPL CALCULATED.4IONS-SCNC: 10 MMOL/L (CALC) (ref 7–17)
AST SERPL-CCNC: 18 U/L (ref 10–30)
BASOPHILS # BLD AUTO: 77 CELLS/UL (ref 0–200)
BASOPHILS NFR BLD AUTO: 1.1 %
BILIRUB SERPL-MCNC: 0.4 MG/DL (ref 0.2–1.2)
BUN SERPL-MCNC: 9 MG/DL (ref 7–25)
CALCIUM SERPL-MCNC: 9.2 MG/DL (ref 8.6–10.2)
CHLORIDE SERPL-SCNC: 100 MMOL/L (ref 98–110)
CHOLEST SERPL-MCNC: 180 MG/DL
CHOLEST/HDLC SERPL: 2.7 (CALC)
CO2 SERPL-SCNC: 28 MMOL/L (ref 20–32)
CREAT SERPL-MCNC: 0.73 MG/DL (ref 0.5–0.97)
EGFRCR SERPLBLD CKD-EPI 2021: 109 ML/MIN/1.73M2
EOSINOPHIL # BLD AUTO: 168 CELLS/UL (ref 15–500)
EOSINOPHIL NFR BLD AUTO: 2.4 %
ERYTHROCYTE [DISTWIDTH] IN BLOOD BY AUTOMATED COUNT: 12.6 % (ref 11–15)
EST. AVERAGE GLUCOSE BLD GHB EST-MCNC: 100 MG/DL
EST. AVERAGE GLUCOSE BLD GHB EST-SCNC: 5.5 MMOL/L
GLUCOSE SERPL-MCNC: 94 MG/DL (ref 65–99)
HBA1C MFR BLD: 5.1 %
HCT VFR BLD AUTO: 42.1 % (ref 35–45)
HDLC SERPL-MCNC: 67 MG/DL
HGB BLD-MCNC: 13.7 G/DL (ref 11.7–15.5)
LDLC SERPL CALC-MCNC: 91 MG/DL (CALC)
LYMPHOCYTES # BLD AUTO: 1820 CELLS/UL (ref 850–3900)
LYMPHOCYTES NFR BLD AUTO: 26 %
MCH RBC QN AUTO: 30.4 PG (ref 27–33)
MCHC RBC AUTO-ENTMCNC: 32.5 G/DL (ref 32–36)
MCV RBC AUTO: 93.6 FL (ref 80–100)
MONOCYTES # BLD AUTO: 441 CELLS/UL (ref 200–950)
MONOCYTES NFR BLD AUTO: 6.3 %
NEUTROPHILS # BLD AUTO: 4494 CELLS/UL (ref 1500–7800)
NEUTROPHILS NFR BLD AUTO: 64.2 %
NONHDLC SERPL-MCNC: 113 MG/DL (CALC)
PLATELET # BLD AUTO: 357 THOUSAND/UL (ref 140–400)
PMV BLD REES-ECKER: 8.8 FL (ref 7.5–12.5)
POTASSIUM SERPL-SCNC: 4.1 MMOL/L (ref 3.5–5.3)
PROT SERPL-MCNC: 7 G/DL (ref 6.1–8.1)
RBC # BLD AUTO: 4.5 MILLION/UL (ref 3.8–5.1)
SODIUM SERPL-SCNC: 138 MMOL/L (ref 135–146)
TRIGL SERPL-MCNC: 123 MG/DL
TSH SERPL-ACNC: 1.23 MIU/L
WBC # BLD AUTO: 7 THOUSAND/UL (ref 3.8–10.8)

## 2025-07-10 RX ORDER — ATORVASTATIN CALCIUM 10 MG/1
10 TABLET, FILM COATED ORAL DAILY
Qty: 90 TABLET | Refills: 1 | Status: SHIPPED | OUTPATIENT
Start: 2025-07-10 | End: 2026-01-06

## 2025-07-10 NOTE — RESULT ENCOUNTER NOTE
Cholesterol, sugar, kidney, liver, electrolytes, complete blood cell count, thyroid look excellent across-the-board  Congratulations, keep up the great work!

## (undated) DEVICE — LIGASURE, V SEALER/DIVIDER  5MM BLUNT TIP

## (undated) DEVICE — HOLSTER, ELECTROSURGERY ACCESSORY, STERILE

## (undated) DEVICE — DRAPE PACK, LAPAROSCOPY ASC, CUSTOM, PARMA

## (undated) DEVICE — DRAPE, UNDERBUTTOCKS

## (undated) DEVICE — CATHETER TRAY, SURESTEP, 16FR, URINE METER W/STATLOCK

## (undated) DEVICE — CLEAN KIT, ANTIFOG SCOPE, SEE SHARP 150MM

## (undated) DEVICE — TROCAR, OPTICAL, BLADELESS, KII FIOS, 5 X 100MM, THREADED

## (undated) DEVICE — TUBE SET, PNEUMOLAR HEATED, SMOKE EVACU, HIGH-FLOW

## (undated) DEVICE — SYRINGE, 10 CC, LUER LOCK

## (undated) DEVICE — TROCAR SYSTEM, BALLOON, KII GELPORT, 12 X 100MM

## (undated) DEVICE — TUBING, CLEAR N-COND, 5MM X 10, LF

## (undated) DEVICE — SLEEVE, VASO PRESS, CALF GARMENT, MEDIUM, GREEN

## (undated) DEVICE — CAUTERY, PENCIL, PUSH BUTTON, SMOKE EVAC, 70MM

## (undated) DEVICE — ASSEMBLY, STRYKER FLOW 2, SUCTION IRRIGATOR, WITH TIP

## (undated) DEVICE — SYRINGE, 20 CC, LUER LOCK

## (undated) DEVICE — DRAPE PACK, LAVH, W/ATTACHED LEGGINGS, W/POUCH, 100 X 114 IN, LF, STERILE

## (undated) DEVICE — ADHESIVE, SKIN, LIQUIBAND EXCEED

## (undated) DEVICE — SYRINGE, 50 CC, LUER LOCK

## (undated) DEVICE — GRASPER TIP, MODIFIED TRADITIONAL, 22.6MM

## (undated) DEVICE — MANIPULATOR, INJECTOR, UTERINE, HUMI, KRONNER, 5 MM, 33 CM

## (undated) DEVICE — SLEEVE, KII, Z-THREAD, 5X100CM